# Patient Record
Sex: FEMALE | Race: WHITE | Employment: FULL TIME | URBAN - METROPOLITAN AREA
[De-identification: names, ages, dates, MRNs, and addresses within clinical notes are randomized per-mention and may not be internally consistent; named-entity substitution may affect disease eponyms.]

---

## 2017-06-01 ENCOUNTER — APPOINTMENT (OUTPATIENT)
Dept: OTHER | Facility: HOSPITAL | Age: 22
End: 2017-06-01
Attending: EMERGENCY MEDICINE

## 2019-08-03 ENCOUNTER — OFFICE VISIT (OUTPATIENT)
Dept: FAMILY MEDICINE CLINIC | Facility: CLINIC | Age: 24
End: 2019-08-03
Payer: COMMERCIAL

## 2019-08-03 ENCOUNTER — LAB ENCOUNTER (OUTPATIENT)
Dept: LAB | Age: 24
End: 2019-08-03
Attending: FAMILY MEDICINE
Payer: COMMERCIAL

## 2019-08-03 VITALS
BODY MASS INDEX: 25.21 KG/M2 | SYSTOLIC BLOOD PRESSURE: 117 MMHG | HEIGHT: 69 IN | DIASTOLIC BLOOD PRESSURE: 78 MMHG | HEART RATE: 62 BPM | TEMPERATURE: 99 F | WEIGHT: 170.19 LBS

## 2019-08-03 DIAGNOSIS — G44.209 TENSION HEADACHE: ICD-10-CM

## 2019-08-03 DIAGNOSIS — G44.209 TENSION HEADACHE: Primary | ICD-10-CM

## 2019-08-03 LAB
ALBUMIN SERPL-MCNC: 3.9 G/DL (ref 3.4–5)
ALBUMIN/GLOB SERPL: 1 {RATIO} (ref 1–2)
ALP LIVER SERPL-CCNC: 90 U/L (ref 37–98)
ALT SERPL-CCNC: 14 U/L (ref 13–56)
ANION GAP SERPL CALC-SCNC: 8 MMOL/L (ref 0–18)
AST SERPL-CCNC: 9 U/L (ref 15–37)
BASOPHILS # BLD AUTO: 0.03 X10(3) UL (ref 0–0.2)
BASOPHILS NFR BLD AUTO: 0.5 %
BILIRUB SERPL-MCNC: 0.3 MG/DL (ref 0.1–2)
BUN BLD-MCNC: 14 MG/DL (ref 7–18)
BUN/CREAT SERPL: 18.4 (ref 10–20)
CALCIUM BLD-MCNC: 9 MG/DL (ref 8.5–10.1)
CHLORIDE SERPL-SCNC: 107 MMOL/L (ref 98–112)
CO2 SERPL-SCNC: 27 MMOL/L (ref 21–32)
CREAT BLD-MCNC: 0.76 MG/DL (ref 0.55–1.02)
DEPRECATED RDW RBC AUTO: 42.5 FL (ref 35.1–46.3)
EOSINOPHIL # BLD AUTO: 0.09 X10(3) UL (ref 0–0.7)
EOSINOPHIL NFR BLD AUTO: 1.6 %
ERYTHROCYTE [DISTWIDTH] IN BLOOD BY AUTOMATED COUNT: 13.8 % (ref 11–15)
GLOBULIN PLAS-MCNC: 4 G/DL (ref 2.8–4.4)
GLUCOSE BLD-MCNC: 88 MG/DL (ref 70–99)
HCT VFR BLD AUTO: 39.9 % (ref 35–48)
HGB BLD-MCNC: 12.4 G/DL (ref 12–16)
IMM GRANULOCYTES # BLD AUTO: 0.01 X10(3) UL (ref 0–1)
IMM GRANULOCYTES NFR BLD: 0.2 %
LYMPHOCYTES # BLD AUTO: 1.59 X10(3) UL (ref 1–4)
LYMPHOCYTES NFR BLD AUTO: 28.6 %
M PROTEIN MFR SERPL ELPH: 7.9 G/DL (ref 6.4–8.2)
MCH RBC QN AUTO: 26.3 PG (ref 26–34)
MCHC RBC AUTO-ENTMCNC: 31.1 G/DL (ref 31–37)
MCV RBC AUTO: 84.5 FL (ref 80–100)
MONOCYTES # BLD AUTO: 0.39 X10(3) UL (ref 0.1–1)
MONOCYTES NFR BLD AUTO: 7 %
NEUTROPHILS # BLD AUTO: 3.45 X10 (3) UL (ref 1.5–7.7)
NEUTROPHILS # BLD AUTO: 3.45 X10(3) UL (ref 1.5–7.7)
NEUTROPHILS NFR BLD AUTO: 62.1 %
OSMOLALITY SERPL CALC.SUM OF ELEC: 294 MOSM/KG (ref 275–295)
PATIENT FASTING: YES
PLATELET # BLD AUTO: 257 10(3)UL (ref 150–450)
POTASSIUM SERPL-SCNC: 4.1 MMOL/L (ref 3.5–5.1)
RBC # BLD AUTO: 4.72 X10(6)UL (ref 3.8–5.3)
SODIUM SERPL-SCNC: 142 MMOL/L (ref 136–145)
TSI SER-ACNC: 2.33 MIU/ML (ref 0.36–3.74)
WBC # BLD AUTO: 5.6 X10(3) UL (ref 4–11)

## 2019-08-03 PROCEDURE — 36415 COLL VENOUS BLD VENIPUNCTURE: CPT

## 2019-08-03 PROCEDURE — 80053 COMPREHEN METABOLIC PANEL: CPT

## 2019-08-03 PROCEDURE — 99203 OFFICE O/P NEW LOW 30 MIN: CPT | Performed by: FAMILY MEDICINE

## 2019-08-03 PROCEDURE — 85025 COMPLETE CBC W/AUTO DIFF WBC: CPT

## 2019-08-03 PROCEDURE — 84443 ASSAY THYROID STIM HORMONE: CPT

## 2019-08-03 RX ORDER — KETOROLAC TROMETHAMINE 10 MG/1
10 TABLET, FILM COATED ORAL EVERY 6 HOURS PRN
Qty: 10 TABLET | Refills: 2 | Status: SHIPPED | OUTPATIENT
Start: 2019-08-03

## 2019-08-03 NOTE — PROGRESS NOTES
8/3/2019  8:48 AM    Cathie Babcock is a 25year old female. Chief complaint(s): Patient presents with:  Headache: x 2 weeks on and off, drowsy, light sensitivity, noise sensitive,     HPI:     Cathie Babcock primary complaint is regarding GRANDA.       John Wong Physical Exam    Constitutional: She appears well-developed and well-nourished.    /78 (BP Location: Right arm, Patient Position: Sitting, Cuff Size: adult)   Pulse 62   Temp 98.6 °F (37 °C) (Oral)   Ht 5' 9\" (1.753 m)   Wt 170 lb 3.2 oz (77.2 kg) Metabolic Panel (14) [E]      TSH W Reflex To Free T4 [E]      Meds This Visit:  Requested Prescriptions     Signed Prescriptions Disp Refills   • Ketorolac Tromethamine 10 MG Oral Tab 10 tablet 2     Sig: Take 1 tablet (10 mg total) by mouth every 6 (six)

## 2019-11-07 ENCOUNTER — OFFICE VISIT (OUTPATIENT)
Dept: FAMILY MEDICINE CLINIC | Facility: CLINIC | Age: 24
End: 2019-11-07
Payer: COMMERCIAL

## 2019-11-07 VITALS
HEIGHT: 69 IN | DIASTOLIC BLOOD PRESSURE: 76 MMHG | TEMPERATURE: 98 F | BODY MASS INDEX: 25.48 KG/M2 | HEART RATE: 73 BPM | SYSTOLIC BLOOD PRESSURE: 114 MMHG | WEIGHT: 172 LBS

## 2019-11-07 DIAGNOSIS — N76.0 ACUTE VAGINITIS: ICD-10-CM

## 2019-11-07 DIAGNOSIS — Z00.00 PHYSICAL EXAM: Primary | ICD-10-CM

## 2019-11-07 DIAGNOSIS — F12.90 MARIJUANA USE: ICD-10-CM

## 2019-11-07 DIAGNOSIS — R30.0 DYSURIA: ICD-10-CM

## 2019-11-07 PROCEDURE — 99213 OFFICE O/P EST LOW 20 MIN: CPT | Performed by: FAMILY MEDICINE

## 2019-11-07 PROCEDURE — 81003 URINALYSIS AUTO W/O SCOPE: CPT | Performed by: FAMILY MEDICINE

## 2019-11-07 PROCEDURE — 99395 PREV VISIT EST AGE 18-39: CPT | Performed by: FAMILY MEDICINE

## 2019-11-07 NOTE — PROGRESS NOTES
11/7/2019  2:19 PM    Patricia Parsons is a 25year old female. Chief complaint(s): Patient presents with:  Routine Physical  UTI    HPI:     Patricia Parsons primary complaint is regarding as above.      Patricia Parsons is a 25year old female present tod There is no immunization history on file for this patient. Medications (Active prior to today's visit):  Current Outpatient Medications   Medication Sig Dispense Refill   • Terconazole 0.4 % Vaginal Cream Place 1 applicator vaginally nightly.  45 g 0 reflex bilaterally. Normal nasal septum, throat clear without lesions or exudate and normal tonsils. Neck: Neck supple. No JVD present. No thyromegaly present.    Cardiovascular: Normal rate, regular rhythm and S2 normal.  Exam reveals no gallop and no fr Negative    APPEARANCE CLEAR Clear    Color Urine YELLOW Yellow    Multistix Lot# [id number] Numeric    Multistix Expiration Date 05/31/2020 Date       EKG / Spirometry : -     Radiology: No results found.      ASSESSMENT/PLAN:   Assessment   Physical exam  (pr Contraception option chosen by patient was condums. REFUSALS:  Although recommended, the patient refuses the following: none . FOLLOW-UP: Schedule a follow-up visit in 12 months. 2. Dysuria  3.  Acute vaginitis    Urinary Tract infection      M Imaging & Referrals:  EKG 12-LEAD         Sumi Brambila MD

## 2019-11-09 ENCOUNTER — LAB ENCOUNTER (OUTPATIENT)
Dept: LAB | Age: 24
End: 2019-11-09
Attending: FAMILY MEDICINE
Payer: COMMERCIAL

## 2019-11-09 DIAGNOSIS — Z00.00 PHYSICAL EXAM: ICD-10-CM

## 2019-11-09 PROCEDURE — 84443 ASSAY THYROID STIM HORMONE: CPT

## 2019-11-09 PROCEDURE — 85025 COMPLETE CBC W/AUTO DIFF WBC: CPT

## 2019-11-09 PROCEDURE — 83036 HEMOGLOBIN GLYCOSYLATED A1C: CPT

## 2019-11-09 PROCEDURE — 80061 LIPID PANEL: CPT

## 2019-11-09 PROCEDURE — 80053 COMPREHEN METABOLIC PANEL: CPT

## 2019-11-09 PROCEDURE — 86304 IMMUNOASSAY TUMOR CA 125: CPT

## 2019-11-09 PROCEDURE — 36415 COLL VENOUS BLD VENIPUNCTURE: CPT

## 2021-06-05 ENCOUNTER — IMMUNIZATION (OUTPATIENT)
Dept: LAB | Facility: HOSPITAL | Age: 26
End: 2021-06-05
Attending: EMERGENCY MEDICINE
Payer: COMMERCIAL

## 2021-06-05 DIAGNOSIS — Z23 NEED FOR VACCINATION: Primary | ICD-10-CM

## 2021-06-05 PROCEDURE — 0001A SARSCOV2 VAC 30MCG/0.3ML IM: CPT

## 2021-06-05 PROCEDURE — 0011A SARSCOV2 VAC 100MCG/0.5ML IM: CPT

## 2021-06-26 ENCOUNTER — IMMUNIZATION (OUTPATIENT)
Dept: LAB | Facility: HOSPITAL | Age: 26
End: 2021-06-26
Attending: EMERGENCY MEDICINE
Payer: COMMERCIAL

## 2021-06-26 DIAGNOSIS — Z23 NEED FOR VACCINATION: Primary | ICD-10-CM

## 2021-06-26 PROCEDURE — 0002A SARSCOV2 VAC 30MCG/0.3ML IM: CPT

## 2022-09-24 ENCOUNTER — OFFICE VISIT (OUTPATIENT)
Dept: FAMILY MEDICINE CLINIC | Facility: CLINIC | Age: 27
End: 2022-09-24

## 2022-09-24 VITALS
HEIGHT: 69 IN | DIASTOLIC BLOOD PRESSURE: 67 MMHG | BODY MASS INDEX: 27.55 KG/M2 | WEIGHT: 186 LBS | SYSTOLIC BLOOD PRESSURE: 103 MMHG | HEART RATE: 67 BPM

## 2022-09-24 DIAGNOSIS — N76.1 SUBACUTE VAGINITIS: ICD-10-CM

## 2022-09-24 DIAGNOSIS — Z00.00 PHYSICAL EXAM: Primary | ICD-10-CM

## 2022-09-24 PROCEDURE — 3074F SYST BP LT 130 MM HG: CPT | Performed by: FAMILY MEDICINE

## 2022-09-24 PROCEDURE — 3008F BODY MASS INDEX DOCD: CPT | Performed by: FAMILY MEDICINE

## 2022-09-24 PROCEDURE — 90471 IMMUNIZATION ADMIN: CPT | Performed by: FAMILY MEDICINE

## 2022-09-24 PROCEDURE — 99395 PREV VISIT EST AGE 18-39: CPT | Performed by: FAMILY MEDICINE

## 2022-09-24 PROCEDURE — 90715 TDAP VACCINE 7 YRS/> IM: CPT | Performed by: FAMILY MEDICINE

## 2022-09-24 PROCEDURE — 3078F DIAST BP <80 MM HG: CPT | Performed by: FAMILY MEDICINE

## 2022-09-26 ENCOUNTER — TELEPHONE (OUTPATIENT)
Dept: FAMILY MEDICINE CLINIC | Facility: CLINIC | Age: 27
End: 2022-09-26

## 2022-09-26 NOTE — TELEPHONE ENCOUNTER
Delfina Montes from [facility] Radiology Dept  Is calling to advise PCP the diagnosis on the mammogram is noted as a physical exam .  Please advise reason for mammogram as patient is 32years old and present diagnosis of physical exam will not pass through insurance.       Please call back at extension   [phone]

## 2022-09-29 LAB
CHLAMYDIA TRACHOMATIS$RNA, TMA: DETECTED
NEISSERIA GONORRHOEAE$RNA, TMA: NOT DETECTED

## 2022-09-30 LAB
ABSOLUTE BASOPHILS: 19 CELLS/UL (ref 0–200)
ABSOLUTE EOSINOPHILS: 202 CELLS/UL (ref 15–500)
ABSOLUTE LYMPHOCYTES: 1978 CELLS/UL (ref 850–3900)
ABSOLUTE MONOCYTES: 554 CELLS/UL (ref 200–950)
ABSOLUTE NEUTROPHILS: 3547 CELLS/UL (ref 1500–7800)
ALBUMIN/GLOBULIN RATIO: 1.3 (CALC) (ref 1–2.5)
ALBUMIN: 4.4 G/DL (ref 3.6–5.1)
ALKALINE PHOSPHATASE: 91 U/L (ref 31–125)
ALT: 9 U/L (ref 6–29)
AST: 12 U/L (ref 10–30)
BASOPHILS: 0.3 %
BILIRUBIN, TOTAL: 0.5 MG/DL (ref 0.2–1.2)
BILIRUBIN: NEGATIVE
BUN: 10 MG/DL (ref 7–25)
CA 125: 14 U/ML
CALCIUM: 9.5 MG/DL (ref 8.6–10.2)
CARBON DIOXIDE: 28 MMOL/L (ref 20–32)
CHLORIDE: 104 MMOL/L (ref 98–110)
CHOL/HDLC RATIO: 2.3 (CALC)
CHOLESTEROL, TOTAL: 125 MG/DL
COLOR: YELLOW
CREATININE: 0.74 MG/DL (ref 0.5–0.96)
EGFR: 114 ML/MIN/1.73M2
EOSINOPHILS: 3.2 %
GLOBULIN: 3.3 G/DL (CALC) (ref 1.9–3.7)
GLUCOSE: 86 MG/DL (ref 65–99)
GLUCOSE: NEGATIVE
HDL CHOLESTEROL: 54 MG/DL
HEMATOCRIT: 41.1 % (ref 35–45)
HEMOGLOBIN A1C: 5.2 % OF TOTAL HGB
HEMOGLOBIN: 12.7 G/DL (ref 11.7–15.5)
KETONES: NEGATIVE
LDL-CHOLESTEROL: 58 MG/DL (CALC)
LYMPHOCYTES: 31.4 %
MCH: 24.9 PG (ref 27–33)
MCHC: 30.9 G/DL (ref 32–36)
MCV: 80.6 FL (ref 80–100)
MONOCYTES: 8.8 %
MPV: 11.6 FL (ref 7.5–12.5)
NEUTROPHILS: 56.3 %
NITRITE: NEGATIVE
NON-HDL CHOLESTEROL: 71 MG/DL (CALC)
OCCULT BLOOD: NEGATIVE
PH: 8.5 (ref 5–8)
PLATELET COUNT: 282 THOUSAND/UL (ref 140–400)
POTASSIUM: 4.3 MMOL/L (ref 3.5–5.3)
PROTEIN, TOTAL: 7.7 G/DL (ref 6.1–8.1)
PROTEIN: NEGATIVE
RDW: 12.8 % (ref 11–15)
RED BLOOD CELL COUNT: 5.1 MILLION/UL (ref 3.8–5.1)
SODIUM: 138 MMOL/L (ref 135–146)
SPECIFIC GRAVITY: 1.02 (ref 1–1.03)
TRIGLYCERIDES: 53 MG/DL
TSH W/REFLEX TO FT4: 2.7 MIU/L
VITAMIN D, 25-OH, TOTAL: 23 NG/ML (ref 30–100)
WHITE BLOOD CELL COUNT: 6.3 THOUSAND/UL (ref 3.8–10.8)

## 2022-09-30 RX ORDER — ERGOCALCIFEROL 1.25 MG/1
50000 CAPSULE ORAL WEEKLY
Qty: 12 CAPSULE | Refills: 4 | Status: SHIPPED | OUTPATIENT
Start: 2022-09-30 | End: 2022-10-30

## 2022-10-03 ENCOUNTER — TELEMEDICINE (OUTPATIENT)
Dept: FAMILY MEDICINE CLINIC | Facility: CLINIC | Age: 27
End: 2022-10-03

## 2022-10-03 DIAGNOSIS — E55.9 VITAMIN D DEFICIENCY: ICD-10-CM

## 2022-10-03 DIAGNOSIS — R82.71 BACTERIA IN URINE: ICD-10-CM

## 2022-10-03 DIAGNOSIS — B37.31 VAGINAL CANDIDIASIS: Primary | ICD-10-CM

## 2022-10-03 RX ORDER — FLUCONAZOLE 200 MG/1
TABLET ORAL
Qty: 2 TABLET | Refills: 0 | Status: SHIPPED | OUTPATIENT
Start: 2022-10-03

## 2022-10-03 RX ORDER — NITROFURANTOIN 25; 75 MG/1; MG/1
100 CAPSULE ORAL 2 TIMES DAILY
Qty: 14 CAPSULE | Refills: 0 | Status: SHIPPED | OUTPATIENT
Start: 2022-10-03

## 2022-10-03 NOTE — ASSESSMENT & PLAN NOTE
macrobid 100 mg I po twice a day x 7 days  Supportive care discussed to help alleviate symptoms  . recheck urine in 2 weeks.

## 2022-11-25 ENCOUNTER — TELEPHONE (OUTPATIENT)
Dept: FAMILY MEDICINE CLINIC | Facility: CLINIC | Age: 27
End: 2022-11-25

## 2022-11-25 DIAGNOSIS — A74.9 CHLAMYDIA INFECTION: Primary | ICD-10-CM

## 2022-11-25 RX ORDER — AZITHROMYCIN 500 MG/1
TABLET, FILM COATED ORAL
Qty: 4 TABLET | Refills: 0 | Status: SHIPPED | OUTPATIENT
Start: 2022-11-25

## 2022-11-25 NOTE — TELEPHONE ENCOUNTER
Spoke to pharmacist Raleigh Mccartney at [facility] and advised of note below. Pharmacist still has concerns with dosing because it is normally a single dose 1 gram. Pharmacist would like further explanation.

## 2022-11-25 NOTE — TELEPHONE ENCOUNTER
Raleigh Mccartney from the pharmacy [facility] called back and he stated to disregard this message from earlier. He will dispense the medication.

## 2022-11-25 NOTE — TELEPHONE ENCOUNTER
Does not appear that pt was treated for chlamydia by Dr Patricia Mack. I have placed order for pt to take azithromycin 1 gm at time of  and repeat in one hour. She will need to contact any sexual partners and she will need to be rechecked in 3 months.

## 2022-11-25 NOTE — TELEPHONE ENCOUNTER
Received fax from [facility] asking for testing and treatment info- patient tested positive for chlamydia on 9/24/22. Dr. Patricia Mack did address results on 9/29/22 and asked that patient come in for appt regarding abnormal results. Patient was seen by Shannan Whitley on 10/3/22 (virtual visit), but I do not see that this was addressed or treated during that visit. Please advise.

## 2023-03-09 ENCOUNTER — OFFICE VISIT (OUTPATIENT)
Dept: FAMILY MEDICINE CLINIC | Facility: CLINIC | Age: 28
End: 2023-03-09

## 2023-03-09 VITALS
DIASTOLIC BLOOD PRESSURE: 72 MMHG | WEIGHT: 194.38 LBS | BODY MASS INDEX: 28.79 KG/M2 | SYSTOLIC BLOOD PRESSURE: 118 MMHG | HEART RATE: 64 BPM | HEIGHT: 69 IN

## 2023-03-09 DIAGNOSIS — A74.9 CHLAMYDIA INFECTION: Primary | ICD-10-CM

## 2023-03-09 DIAGNOSIS — Z11.3 SCREEN FOR STD (SEXUALLY TRANSMITTED DISEASE): ICD-10-CM

## 2023-03-09 DIAGNOSIS — N76.1 SUBACUTE VAGINITIS: ICD-10-CM

## 2023-03-09 PROCEDURE — 3078F DIAST BP <80 MM HG: CPT | Performed by: FAMILY MEDICINE

## 2023-03-09 PROCEDURE — 3008F BODY MASS INDEX DOCD: CPT | Performed by: FAMILY MEDICINE

## 2023-03-09 PROCEDURE — 3074F SYST BP LT 130 MM HG: CPT | Performed by: FAMILY MEDICINE

## 2023-03-09 PROCEDURE — 99213 OFFICE O/P EST LOW 20 MIN: CPT | Performed by: FAMILY MEDICINE

## 2023-03-10 ENCOUNTER — TELEPHONE (OUTPATIENT)
Dept: FAMILY MEDICINE CLINIC | Facility: CLINIC | Age: 28
End: 2023-03-10

## 2023-03-10 LAB
CHLAMYDIA TRACHOMATIS$RNA, TMA: NOT DETECTED
NEISSERIA GONORRHOEAE$RNA, TMA: NOT DETECTED

## 2023-03-10 NOTE — TELEPHONE ENCOUNTER
Spoke with Meena from [facility] lab,  verified  Claudia Polanco stated to clarify lab order for genital vaginosis and to call her back tel #[phone]   She stated they rec'd the sample but we  didn't collect the right sample. Is MD looking for bacterial or vaginal culture? Reference # [id number]  pls advise, thanks in advance.

## 2023-03-16 LAB
CHLAMYDIA TRACHOMATIS$RNA, TMA: NOT DETECTED
NEISSERIA GONORRHOEAE$RNA, TMA: NOT DETECTED

## 2023-03-17 RX ORDER — AMOXICILLIN AND CLAVULANATE POTASSIUM 875; 125 MG/1; MG/1
1 TABLET, FILM COATED ORAL 2 TIMES DAILY
Qty: 14 TABLET | Refills: 0 | Status: SHIPPED | OUTPATIENT
Start: 2023-03-17 | End: 2023-03-24

## 2023-03-20 ENCOUNTER — MED REC SCAN ONLY (OUTPATIENT)
Dept: FAMILY MEDICINE CLINIC | Facility: CLINIC | Age: 28
End: 2023-03-20

## 2024-09-11 ENCOUNTER — OFFICE VISIT (OUTPATIENT)
Dept: FAMILY MEDICINE CLINIC | Facility: CLINIC | Age: 29
End: 2024-09-11
Payer: COMMERCIAL

## 2024-09-11 VITALS
BODY MASS INDEX: 28.58 KG/M2 | DIASTOLIC BLOOD PRESSURE: 80 MMHG | HEART RATE: 72 BPM | SYSTOLIC BLOOD PRESSURE: 123 MMHG | WEIGHT: 193 LBS | HEIGHT: 69 IN

## 2024-09-11 DIAGNOSIS — Z3A.01 LESS THAN 8 WEEKS GESTATION OF PREGNANCY (HCC): ICD-10-CM

## 2024-09-11 DIAGNOSIS — Z00.00 PHYSICAL EXAM: Primary | ICD-10-CM

## 2024-09-11 LAB
CONTROL LINE PRESENT WITH A CLEAR BACKGROUND (YES/NO): YES YES/NO
KIT LOT #: NORMAL NUMERIC
PREGNANCY TEST, URINE: POSITIVE

## 2024-09-11 PROCEDURE — 99395 PREV VISIT EST AGE 18-39: CPT | Performed by: FAMILY MEDICINE

## 2024-09-11 PROCEDURE — 81025 URINE PREGNANCY TEST: CPT | Performed by: FAMILY MEDICINE

## 2024-09-11 PROCEDURE — 99213 OFFICE O/P EST LOW 20 MIN: CPT | Performed by: FAMILY MEDICINE

## 2024-09-11 RX ORDER — PNV NO.95/FERROUS FUM/FOLIC AC 28MG-0.8MG
1 TABLET ORAL NIGHTLY
Qty: 90 TABLET | Refills: 2 | Status: SHIPPED | OUTPATIENT
Start: 2024-09-11 | End: 2024-10-11

## 2024-09-11 NOTE — PROGRESS NOTES
9/11/2024  9:47 AM    Catalina Hodges is a 29 year old female.    Chief complaint(s):   Chief Complaint   Patient presents with    Physical    Pregnancy     HPI:     Catalina Hodges primary complaint is regarding CPE.     Catalina Hodges is a 29 year old female present today for a routine periodic health gynecological screening and/or complete physical examination.  Her last physical exam was 2 year(s) ago. Patient's last menstrual period was 07/25/2024 (approximate).     Menarche occurred at age 13 .  She is currently using none as a form of contraception.    Catalina Hodges is G 1, P0, Ab 0.   She has a history of veneral infection significant for none.   She performs breast self-exams monthly .  Her last TDAP (orTD) booster was 3 years ago.  She is not current with her influenza immunization.  Her last Pap smear was none.  Her last mammogram was none.  Regarding colon cancer  screening test she underwent colonoscopy none . Occasionally smoke.         HISTORY:  History reviewed. No pertinent past medical history.   History reviewed. No pertinent surgical history.   Family History   Problem Relation Age of Onset    Cancer Mother 40        breast cancer      Social History:   Social History     Socioeconomic History    Marital status: Single   Tobacco Use    Smoking status: Never    Smokeless tobacco: Never   Vaping Use    Vaping status: Never Used   Substance and Sexual Activity    Alcohol use: Yes    Drug use: Never        Immunizations:   Immunization History   Administered Date(s) Administered    Covid-19 Vaccine Pfizer 30 mcg/0.3 ml 06/05/2021, 06/26/2021    HPV (Gardasil) 06/09/2014    TDAP 09/24/2022       Medications (Active prior to today's visit):  Current Outpatient Medications   Medication Sig Dispense Refill    Prenatal Vit-Fe Fumarate-FA (PRENATAL VITAMINS) 28-0.8 MG Oral Tab Take 1 tablet by mouth at bedtime. 90 tablet 2       Allergies:  No Known Allergies      ROS:   Review of Systems    Constitutional:  Negative for appetite change, diaphoresis, fatigue and fever.   HENT:  Negative for hearing loss and nosebleeds.    Eyes:  Negative for visual disturbance.   Respiratory:  Negative for shortness of breath.    Cardiovascular:  Negative for chest pain and palpitations.   Gastrointestinal:  Negative for abdominal pain, diarrhea, nausea and vomiting.   Endocrine: Negative for polydipsia and polyuria.   Genitourinary:  Positive for menstrual problem. Negative for hematuria.   Musculoskeletal:  Negative for arthralgias.   Skin:  Negative for rash.   Neurological:  Positive for headaches. Negative for dizziness.   Psychiatric/Behavioral:  Negative for dysphoric mood and sleep disturbance.      PHYSICAL EXAM:   VS: /80 (BP Location: Right arm, Patient Position: Sitting, Cuff Size: adult)   Pulse 72   Ht 5' 9\" (1.753 m)   Wt 193 lb (87.5 kg)   LMP 07/25/2024 (Exact Date)   BMI 28.50 kg/m²     Physical Exam  Vitals reviewed.   HENT:      Head: Normocephalic.      Right Ear: Hearing, tympanic membrane, ear canal and external ear normal.      Left Ear: Hearing, tympanic membrane, ear canal and external ear normal.      Nose: Nose normal.      Mouth/Throat:      Mouth: Mucous membranes are moist.      Pharynx: Oropharynx is clear.   Eyes:      Extraocular Movements: Extraocular movements intact.      Conjunctiva/sclera: Conjunctivae normal.      Pupils: Pupils are equal, round, and reactive to light.   Neck:      Thyroid: No thyromegaly.   Cardiovascular:      Rate and Rhythm: Normal rate and regular rhythm.      Heart sounds: Normal heart sounds, S1 normal and S2 normal. No murmur heard.  Pulmonary:      Effort: Pulmonary effort is normal.      Breath sounds: Normal breath sounds.   Chest:   Breasts:     Right: Normal. No mass, nipple discharge or tenderness.      Left: Normal. No mass, nipple discharge or tenderness.   Abdominal:      General: Bowel sounds are normal.      Palpations: Abdomen is  soft. There is no mass.      Tenderness: There is no abdominal tenderness.   Genitourinary:     Cervix: Cervical bleeding: small.      Comments: GYN pelvic exam: Normal external genitalia, without lesions or condyloma.  There is no vaginal discharge , cervix with no motion tenderness.  Adnexal with no masses or tenderness uterine normal appropriate size without tenderness.  Musculoskeletal:      Cervical back: Neck supple.      Right lower leg: No edema.      Left lower leg: No edema.      Comments: Spine without scoliosis or kyphosis.  Range of motions of both upper and lower extremities are normal.       Skin:     Findings: No rash.   Neurological:      General: No focal deficit present.      Mental Status: She is alert and oriented to person, place, and time.      Deep Tendon Reflexes:      Reflex Scores:       Patellar reflexes are 2+ on the right side and 2+ on the left side.     Comments: No focal neurological dificits.  Normal gait and coordination.   Psychiatric:         Mood and Affect: Mood and affect normal.         LABORATORY RESULTS:     EKG / Spirometry : -     Radiology: No results found.     ASSESSMENT/PLAN:   Assessment   Encounter Diagnoses   Name Primary?    Physical exam Yes    Less than 8 weeks gestation of pregnancy (HCC)        1. Physical exam    Assessment and Plan:     Catalina Hodges Health checkup as follows:    LABORATORY & ORDERS:   Orders Placed This Encounter   Procedures    POC Urine pregnancy test [95119]    CBC With Differential With Platelet    Comp Metabolic Panel (14)    Hemoglobin A1C    Lipid Panel    TSH W Reflex To Free T4    Vitamin D    Urinalysis with Culture Reflex    THIN PREP PAP W/ HPV REFLEX [[id number]][Q]    Rubella, IGG    T Pallidum Screening Cascade    Chlamydia/Gc Amplification    Urine Culture, Routine      REFERRALS: generated today : OBG - INTERNAL  US PREG 1ST TRIM W/EV (CPT=76801/95921) .    IMMUNIZATIONS ordered and given today include:  none.    RECOMMENDATIONS given include: ANTICIPATORY GUIDANCE  topics covered today include: safety (i.e. seat belts, helmets, sunscreen, protective sports gear ), nutrition (i.e. healthy meals and snacks (i.e. avoid junk food and high-carbohydrate foods); athletic conditioning, fluids; low fat milk, limit to less than 20 oz. a day; dental care with her dentist), and healthy habits & social competence & responsibilities: Recommendations on physical activity; exercise daily or at least 3 times a week for 30-60 minutes doing cardiovascular exercise. Patient educated on self breast examination to be done on a monthly basis.  REFUSALS:  Although recommended, the patient refuses the following: none .      FOLLOW-UP: Schedule a follow-up visit in 12 months.         2. Less than 8 weeks gestation of pregnancy (HCC)    MEDICATIONS:     Requested Prescriptions     Signed Prescriptions Disp Refills    Prenatal Vit-Fe Fumarate-FA (PRENATAL VITAMINS) 28-0.8 MG Oral Tab 90 tablet 2     Sig: Take 1 tablet by mouth at bedtime.           LABORATORY & ORDERS:   Orders Placed This Encounter   Procedures    POC Urine pregnancy test [33634]    CBC With Differential With Platelet    Comp Metabolic Panel (14)    Hemoglobin A1C    Lipid Panel    TSH W Reflex To Free T4    Vitamin D    Urinalysis with Culture Reflex    THIN PREP PAP W/ HPV REFLEX [38415][Q]    Rubella, IGG    T Pallidum Screening Cascade    Chlamydia/Gc Amplification    Urine Culture, Routine       REFERRALS: OBG - INTERNAL  US PREG 1ST TRIM W/EV (CPT=76801/27916),    THIN PREP PAP W/ HPV REFLEX [76292][Q]            OBG Referral - In Network     PREG 1ST TRIM W/EV (CPT=76801/07165)        RECOMMENDATIONS given include: Patient was reassured of  her medical condition and all questions and concerns were answered. Patient was informed to please, call our office with any new or further questions or concerns that may come up in the near future. Notify Dr Noonan or the  [facility] if there is a deterioration or worsening of the medical condition. Also, inform the doctor with any new symptoms or medications' side effects.      FOLLOW-UP: Schedule a follow-up visit in  prn.               Orders This Visit:  Orders Placed This Encounter   Procedures    POC Urine pregnancy test [31032]    CBC With Differential With Platelet    Comp Metabolic Panel (14)    Hemoglobin A1C    Lipid Panel    TSH W Reflex To Free T4    Vitamin D    Urinalysis with Culture Reflex    THIN PREP PAP W/ HPV REFLEX [38220][Q]    Rubella, IGG    T Pallidum Screening Cascade    Chlamydia/Gc Amplification    Urine Culture, Routine       Meds This Visit:  Requested Prescriptions     Signed Prescriptions Disp Refills    Prenatal Vit-Fe Fumarate-FA (PRENATAL VITAMINS) 28-0.8 MG Oral Tab 90 tablet 2     Sig: Take 1 tablet by mouth at bedtime.       Imaging & Referrals:  OBG - INTERNAL  US PREG 1ST TRIM W/EV (CPT=76801/23485)         SARY RAWLS MD

## 2024-09-12 LAB
ABSOLUTE BASOPHILS: 23 CELLS/UL (ref 0–200)
ABSOLUTE EOSINOPHILS: 68 CELLS/UL (ref 15–500)
ABSOLUTE LYMPHOCYTES: 1989 CELLS/UL (ref 850–3900)
ABSOLUTE MONOCYTES: 428 CELLS/UL (ref 200–950)
ABSOLUTE NEUTROPHILS: 3192 CELLS/UL (ref 1500–7800)
ALBUMIN/GLOBULIN RATIO: 1.2 (CALC) (ref 1–2.5)
ALBUMIN: 4.3 G/DL (ref 3.6–5.1)
ALKALINE PHOSPHATASE: 98 U/L (ref 31–125)
ALT: 9 U/L (ref 6–29)
APPEARANCE: CLEAR
AST: 14 U/L (ref 10–30)
BASOPHILS: 0.4 %
BILIRUBIN, TOTAL: 0.5 MG/DL (ref 0.2–1.2)
BILIRUBIN: NEGATIVE
BUN: 7 MG/DL (ref 7–25)
CALCIUM: 9.6 MG/DL (ref 8.6–10.2)
CARBON DIOXIDE: 25 MMOL/L (ref 20–32)
CHLORIDE: 104 MMOL/L (ref 98–110)
CHOL/HDLC RATIO: 2.7 (CALC)
CHOLESTEROL, TOTAL: 136 MG/DL
COLOR: YELLOW
CREATININE: 0.67 MG/DL (ref 0.5–0.96)
EGFR: 121 ML/MIN/1.73M2
EOSINOPHILS: 1.2 %
GLOBULIN: 3.6 G/DL (CALC) (ref 1.9–3.7)
GLUCOSE: 87 MG/DL (ref 65–99)
GLUCOSE: NEGATIVE
HDL CHOLESTEROL: 50 MG/DL
HEMATOCRIT: 41 % (ref 35–45)
HEMOGLOBIN A1C: 5.6 % OF TOTAL HGB
HEMOGLOBIN: 12.7 G/DL (ref 11.7–15.5)
LDL-CHOLESTEROL: 74 MG/DL (CALC)
LEUKOCYTE ESTERASE: NEGATIVE
LYMPHOCYTES: 34.9 %
MCH: 25.5 PG (ref 27–33)
MCHC: 31 G/DL (ref 32–36)
MCV: 82.2 FL (ref 80–100)
MONOCYTES: 7.5 %
MPV: 11.9 FL (ref 7.5–12.5)
NEUTROPHILS: 56 %
NITRITE: NEGATIVE
NON-HDL CHOLESTEROL: 86 MG/DL (CALC)
OCCULT BLOOD: NEGATIVE
PH: 7.5 (ref 5–8)
PLATELET COUNT: 299 THOUSAND/UL (ref 140–400)
POTASSIUM: 4.3 MMOL/L (ref 3.5–5.3)
PROTEIN, TOTAL: 7.9 G/DL (ref 6.1–8.1)
PROTEIN: NEGATIVE
RDW: 12.9 % (ref 11–15)
RED BLOOD CELL COUNT: 4.99 MILLION/UL (ref 3.8–5.1)
RUBELLA ANTIBODY (IGG): 2.26 INDEX
SODIUM: 138 MMOL/L (ref 135–146)
SPECIFIC GRAVITY: 1.01 (ref 1–1.03)
T. PALLIDUM AB, EIA: NEGATIVE
TRIGLYCERIDES: 42 MG/DL
TSH W/REFLEX TO FT4: 2.26 MIU/L
WHITE BLOOD CELL COUNT: 5.7 THOUSAND/UL (ref 3.8–10.8)

## 2024-09-16 LAB
CHLAMYDIA TRACHOMATIS$RNA, TMA: DETECTED
NEISSERIA GONORRHOEAE$RNA, TMA: NOT DETECTED

## 2024-09-16 RX ORDER — AZITHROMYCIN 250 MG/1
TABLET, FILM COATED ORAL
Qty: 4 TABLET | Refills: 0 | Status: SHIPPED | OUTPATIENT
Start: 2024-09-16

## 2024-09-17 ENCOUNTER — TELEPHONE (OUTPATIENT)
Dept: FAMILY MEDICINE CLINIC | Facility: CLINIC | Age: 29
End: 2024-09-17

## 2024-09-17 NOTE — TELEPHONE ENCOUNTER
----- Message from SARY RAWLS sent at 9/16/2024  8:15 AM CDT -----  Please call patient, + Chlamydia. Rx sen to her pharmacy. Recheck and make appt with me in 2-4 weeks. Also partner needs treatment.

## 2024-09-17 NOTE — TELEPHONE ENCOUNTER
Spoke with the patient informed her of message and instructions below    Has not yet  medication, will do so later tonight.    Assisted with appointment    Future Appointments   Date Time Provider Department Center   10/10/2024 11:15 AM [facility]   10/17/2024  1:00 PM ADO US [facility] ADO US [facility]     [facility] form started     RN please call in am to see if patient has  script and sign and send forms

## 2024-09-18 NOTE — TELEPHONE ENCOUNTER
Left message to call back for patient.    This RN spoke with Keshawn at [facility]- confirmed that prescription has been picked up by patient yet at this time.

## 2024-10-05 ENCOUNTER — MED REC SCAN ONLY (OUTPATIENT)
Dept: FAMILY MEDICINE CLINIC | Facility: CLINIC | Age: 29
End: 2024-10-05

## 2024-10-11 ENCOUNTER — OFFICE VISIT (OUTPATIENT)
Dept: FAMILY MEDICINE CLINIC | Facility: CLINIC | Age: 29
End: 2024-10-11
Payer: COMMERCIAL

## 2024-10-11 VITALS
BODY MASS INDEX: 28.58 KG/M2 | HEART RATE: 68 BPM | DIASTOLIC BLOOD PRESSURE: 67 MMHG | HEIGHT: 69 IN | SYSTOLIC BLOOD PRESSURE: 101 MMHG | WEIGHT: 193 LBS

## 2024-10-11 DIAGNOSIS — A74.9 CHLAMYDIA INFECTION: Primary | ICD-10-CM

## 2024-10-11 DIAGNOSIS — Z3A.01 LESS THAN 8 WEEKS GESTATION OF PREGNANCY (HCC): ICD-10-CM

## 2024-10-11 PROCEDURE — 99213 OFFICE O/P EST LOW 20 MIN: CPT | Performed by: FAMILY MEDICINE

## 2024-10-11 NOTE — PROGRESS NOTES
10/11/2024  12:29 PM    Catalina Hodges is a 29 year old female.    Chief complaint(s):   Chief Complaint   Patient presents with    Test Results     GC      HPI:     Catalina Hodges primary complaint is regarding chlamydia.     Patient is a 29-year-old female who recently tested positive for chlamydia vaginally.  She took antibiotic already but partner has not been treated.  She denies any sex since she finished the treatment.  She is only had vaginal sex with 1 person.  Will provide a EPT prescription today.  Also she is pregnant at present time.  Has not had the ultrasound that was ordered last time.  Patient's last menstrual period was 07/25/2024 (exact date).  She has been referred already to obstetrician Dr. Sewell.       HISTORY:  No past medical history on file.   No past surgical history on file.   Family History   Problem Relation Age of Onset    Cancer Mother 40        breast cancer      Social History:   Social History     Socioeconomic History    Marital status: Single   Tobacco Use    Smoking status: Never    Smokeless tobacco: Never   Vaping Use    Vaping status: Never Used   Substance and Sexual Activity    Alcohol use: Yes    Drug use: Never        Immunizations:   Immunization History   Administered Date(s) Administered    Covid-19 Vaccine Pfizer 30 mcg/0.3 ml 06/05/2021, 06/26/2021    HPV (Gardasil) 06/09/2014    TDAP 09/24/2022       Medications (Active prior to today's visit):  Current Outpatient Medications   Medication Sig Dispense Refill    Prenatal Vit-Fe Fumarate-FA (PRENATAL VITAMINS) 28-0.8 MG Oral Tab Take 1 tablet by mouth at bedtime. 90 tablet 2    azithromycin 250 MG Oral Tab 4 tab po today and done (Patient not taking: Reported on 10/11/2024) 4 tablet 0       Allergies:  Allergies[1]      ROS:   Review of Systems   Constitutional:  Negative for appetite change and fever.   Eyes:  Negative for visual disturbance.   Respiratory:  Negative for shortness of breath.    Cardiovascular:   Negative for chest pain.   Gastrointestinal:  Negative for abdominal pain, nausea and vomiting.   Genitourinary:  Positive for menstrual problem (pregnancy). Negative for dysuria, vaginal bleeding, vaginal discharge and vaginal pain.   Musculoskeletal:  Negative for back pain.   Skin:  Negative for rash.   Neurological:  Negative for dizziness and headaches.       PHYSICAL EXAM:   VS: /67   Pulse 68   Ht 5' 9\" (1.753 m)   Wt 193 lb (87.5 kg)   LMP 07/25/2024 (Exact Date)   BMI 28.50 kg/m²     Physical Exam  Vitals reviewed.   Constitutional:       General: She is not in acute distress.     Appearance: Normal appearance.   HENT:      Head: Normocephalic.   Eyes:      Conjunctiva/sclera: Conjunctivae normal.   Cardiovascular:      Rate and Rhythm: Normal rate.   Pulmonary:      Effort: Pulmonary effort is normal.   Genitourinary:     Vagina: No tenderness.      Cervix: Normal. No cervical motion tenderness.   Musculoskeletal:      Cervical back: Neck supple.   Skin:     Findings: No rash.   Psychiatric:         Mood and Affect: Mood normal.         LABORATORY RESULTS:   No results found for: \"URCOLOR\", \"URCLA\", \"URINELEUK\", \"URINENITRITE\", \"URINEBLOOD\"   Results for orders placed or performed in visit on 09/11/24   POC Urine pregnancy test [16572]    Collection Time: 09/11/24  9:49 AM   Result Value Ref Range    Pregnancy Test, Urine positive     Control Line Present with a clear background (yes/no) yes Yes/No    Kit Lot # [id number] Numeric    Kit Expiration Date 01- Date   THIN PREP PAP W/ HPV REFLEX [[id number]][Q]    Collection Time: 09/11/24  9:53 AM   Result Value Ref Range    CLINICAL INFORMATION:      LMP:      PREV. PAP:      PREV. BX:      SOURCE:      STATEMENT OF ADEQUACY:      INTERPRETATION/RESULT:      COMMENT:      CYTOTECHNOLOGIST:      COMMENT     Chlamydia/Gc Amplification    Collection Time: 09/11/24  9:53 AM   Result Value Ref Range    CHLAMYDIA TRACHOMATIS$RNA, TMA DETECTED (A) NOT  DETECTED    NEISSERIA GONORRHOEAE$RNA, TMA NOT DETECTED NOT DETECTED    .      CBC With Differential With Platelet    Collection Time: 09/11/24 12:05 PM   Result Value Ref Range    WHITE BLOOD CELL COUNT 5.7 3.8 - 10.8 Thousand/uL    RED BLOOD CELL COUNT 4.99 3.80 - 5.10 Million/uL    HEMOGLOBIN 12.7 11.7 - 15.5 g/dL    HEMATOCRIT 41.0 35.0 - 45.0 %    MCV 82.2 80.0 - 100.0 fL    MCH 25.5 (L) 27.0 - 33.0 pg    MCHC 31.0 (L) 32.0 - 36.0 g/dL    RDW 12.9 11.0 - 15.0 %    PLATELET COUNT 299 140 - 400 Thousand/uL    MPV 11.9 7.5 - 12.5 fL    ABSOLUTE NEUTROPHILS 3,192 1,500 - 7,800 cells/uL    ABSOLUTE LYMPHOCYTES 1,989 850 - 3,900 cells/uL    ABSOLUTE MONOCYTES 428 200 - 950 cells/uL    ABSOLUTE EOSINOPHILS 68 15 - 500 cells/uL    ABSOLUTE BASOPHILS 23 0 - 200 cells/uL    NEUTROPHILS 56 %    LYMPHOCYTES 34.9 %    MONOCYTES 7.5 %    EOSINOPHILS 1.2 %    BASOPHILS 0.4 %   Comp Metabolic Panel (14)    Collection Time: 09/11/24 12:05 PM   Result Value Ref Range    GLUCOSE 87 65 - 99 mg/dL    UREA NITROGEN (BUN) 7 7 - 25 mg/dL    CREATININE 0.67 0.50 - 0.96 mg/dL    EGFR 121 > OR = 60 mL/min/1.73m2    BUN/CREATININE RATIO SEE NOTE: 6 - 22 (calc)    SODIUM 138 135 - 146 mmol/L    POTASSIUM 4.3 3.5 - 5.3 mmol/L    CHLORIDE 104 98 - 110 mmol/L    CARBON DIOXIDE 25 20 - 32 mmol/L    CALCIUM 9.6 8.6 - 10.2 mg/dL    PROTEIN, TOTAL 7.9 6.1 - 8.1 g/dL    ALBUMIN 4.3 3.6 - 5.1 g/dL    GLOBULIN 3.6 1.9 - 3.7 g/dL (calc)    ALBUMIN/GLOBULIN RATIO 1.2 1.0 - 2.5 (calc)    BILIRUBIN, TOTAL 0.5 0.2 - 1.2 mg/dL    ALKALINE PHOSPHATASE 98 31 - 125 U/L    AST 14 10 - 30 U/L    ALT 9 6 - 29 U/L   Hemoglobin A1C    Collection Time: 09/11/24 12:05 PM   Result Value Ref Range    HEMOGLOBIN A1c 5.6 <5.7 % of total Hgb   Lipid Panel    Collection Time: 09/11/24 12:05 PM   Result Value Ref Range    CHOLESTEROL, TOTAL 136 <200 mg/dL    HDL CHOLESTEROL 50 > OR = 50 mg/dL    TRIGLYCERIDES 42 <150 mg/dL    LDL-CHOLESTEROL 74 mg/dL (calc)    CHOL/HDLC  RATIO 2.7 <5.0 (calc)    NON-HDL CHOLESTEROL 86 <130 mg/dL (calc)   TSH W Reflex To Free T4    Collection Time: 09/11/24 12:05 PM   Result Value Ref Range    TSH W/REFLEX TO FT4 2.26 mIU/L   Rubella, IGG    Collection Time: 09/11/24 12:05 PM   Result Value Ref Range    RUBELLA ANTIBODY (IGG) 2.26 Index   T Pallidum Screening Cascade    Collection Time: 09/11/24 12:05 PM   Result Value Ref Range    T. PALLIDUM AB, EIA NEGATIVE NEGATIVE   Urinalysis with Culture Reflex    Collection Time: 09/11/24 12:08 PM    Specimen: Urine   Result Value Ref Range    COLOR YELLOW YELLOW    APPEARANCE CLEAR CLEAR    SPECIFIC GRAVITY 1.011 1.001 - 1.035    PH 7.5 5.0 - 8.0    GLUCOSE NEGATIVE NEGATIVE    BILIRUBIN NEGATIVE NEGATIVE    KETONES TRACE (A) NEGATIVE    OCCULT BLOOD NEGATIVE NEGATIVE    PROTEIN NEGATIVE NEGATIVE    NITRITE NEGATIVE NEGATIVE    LEUKOCYTE ESTERASE NEGATIVE NEGATIVE    WBC Urine NONE SEEN < OR = 5 /HPF    RBC NONE SEEN < OR = 2 /HPF    SQUAMOUS EPITHELIAL CELLS NONE SEEN < OR = 5 /HPF    BACTERIA NONE SEEN NONE SEEN /HPF    HYALINE CAST NONE SEEN NONE SEEN /LPF    NOTE      REFLEXIVE URINE CULTURE         EKG / Spirometry : -     Radiology: No results found.     ASSESSMENT/PLAN:   Assessment   Encounter Diagnoses   Name Primary?    Chlamydia infection Yes    Less than 8 weeks gestation of pregnancy (HCC)        MEDICATIONS:   RX given for EPT of zithro 4 tab po         LABORATORY & ORDERS:   Orders Placed This Encounter   Procedures    Chlamydia/Gc Amplification       REFERRALS: KPA with OB doctor     RECOMMENDATIONS given include: Patient was reassured of  her medical condition and all questions and concerns were answered. Patient was informed to please, call our office with any new or further questions or concerns that may come up in the near future. Notify Dr Noonan or the [facility] if there is a deterioration or worsening of the medical condition. Also, inform the doctor with any new symptoms or  medications' side effects.      FOLLOW-UP: Schedule a follow-up visit in  prn.            Orders This Visit:  Orders Placed This Encounter   Procedures    Chlamydia/Gc Amplification       Meds This Visit:  Requested Prescriptions      No prescriptions requested or ordered in this encounter       Imaging & Referrals:  None         SARY RAWLS MD         [1] No Known Allergies

## 2024-10-14 LAB
C TRACH DNA SPEC QL NAA+PROBE: NEGATIVE
N GONORRHOEA DNA SPEC QL NAA+PROBE: NEGATIVE

## 2024-10-17 ENCOUNTER — HOSPITAL ENCOUNTER (OUTPATIENT)
Dept: ULTRASOUND IMAGING | Age: 29
Discharge: HOME | End: 2024-10-17
Attending: FAMILY MEDICINE
Payer: COMMERCIAL

## 2024-10-17 DIAGNOSIS — Z3A.01 LESS THAN 8 WEEKS GESTATION OF PREGNANCY (HCC): ICD-10-CM

## 2024-10-17 PROCEDURE — 76801 OB US < 14 WKS SINGLE FETUS: CPT | Performed by: FAMILY MEDICINE

## 2024-12-06 ENCOUNTER — TELEPHONE (OUTPATIENT)
Dept: OBGYN CLINIC | Facility: CLINIC | Age: 29
End: 2024-12-06

## 2024-12-06 NOTE — TELEPHONE ENCOUNTER
Pt name and  verified. Pt calling to establish prenatal care    Lmp:  19w1d  + hpt: second week of August  Regular cycles  Denies hx of miscarriage  Prenatal care prior: denies    Pt scheduled for missed menses on  with Dr. Sewell. Pt aware of scheduling details.

## 2024-12-06 NOTE — TELEPHONE ENCOUNTER
Patient would like to establish prenatal care. LMP in June or July. Has not had prenatal care yet. Please call.

## 2024-12-17 ENCOUNTER — INITIAL PRENATAL (OUTPATIENT)
Dept: OBGYN CLINIC | Facility: CLINIC | Age: 29
End: 2024-12-17
Payer: COMMERCIAL

## 2024-12-17 VITALS
SYSTOLIC BLOOD PRESSURE: 117 MMHG | WEIGHT: 198 LBS | DIASTOLIC BLOOD PRESSURE: 72 MMHG | HEART RATE: 77 BPM | BODY MASS INDEX: 29 KG/M2

## 2024-12-17 DIAGNOSIS — O09.899: ICD-10-CM

## 2024-12-17 DIAGNOSIS — Z34.01 ENCOUNTER FOR SUPERVISION OF NORMAL FIRST PREGNANCY IN FIRST TRIMESTER (HCC): Primary | ICD-10-CM

## 2024-12-17 PROCEDURE — 90656 IIV3 VACC NO PRSV 0.5 ML IM: CPT | Performed by: STUDENT IN AN ORGANIZED HEALTH CARE EDUCATION/TRAINING PROGRAM

## 2024-12-17 PROCEDURE — 90471 IMMUNIZATION ADMIN: CPT | Performed by: STUDENT IN AN ORGANIZED HEALTH CARE EDUCATION/TRAINING PROGRAM

## 2024-12-17 NOTE — PROGRESS NOTES
[facility]  Obstetrics and Gynecology  History & Physical    CC: Establish prenatal care     Subjective:     HPI:  Catalina Hodges is a 29 year old  female at 20w5d who presents today to establish prenatal care.   Partner is not here but involved.     Patient's last menstrual period was 2024 (exact date).   LMP - certain  Menses - regular    Pregnancy was unplanned but desired.   Was using not using contraception at time of conception  Plan to keep/continue pregnancy? y   Folic acid prior to conceiving? y  Currently taking PNV containing iron? n    Symptoms this pregnancy:   Vaginal bleeding during pregnancy? n  Pelvic cramping/pain? n   Abnormal vaginal discharge? n     Partner/Father of the baby   -Medical conditions? n  -FHx of genetic diseases or birth defects? n     Review of Systems: negative except per HPI     PCP SARY RAWLS MD     OB:  OB History    Para Term  AB Living   1             SAB IAB Ectopic Multiple Live Births                  # Outcome Date GA Lbr Pravin/2nd Weight Sex Type Anes PTL Lv   1 Current                GYN:   STD -chlamydia 2024 s/p tx and neg rafat 10/2024  Abn pap? n  Pap utd    PMH: No past medical history on file.     PSH:  No past surgical history on file.    MEDS:  Medications Ordered Prior to Encounter[1]    Allergies:    Allergies[2]    Immunizations:  Immunization History   Administered Date(s) Administered    Covid-19 Vaccine Pfizer 30 mcg/0.3 ml 2021, 2021    HPV (Gardasil) 2014    TDAP 2022   Pended Date(s) Pended    Influenza Vaccine, trivalent (IIV3), PF 0.5mL (30273) 2024       Family Hx:   Family History   Problem Relation Age of Onset    Cancer Mother 40        breast cancer       SocialHx:    Social History     Socioeconomic History    Marital status: Single   Tobacco Use    Smoking status: Never    Smokeless tobacco: Never   Vaping Use    Vaping status: Never Used   Substance and Sexual Activity    Alcohol use: Yes     Drug use: Never       Objective:   /72   Pulse 77   Wt 198 lb (89.8 kg)   LMP 2024 (Exact Date)   BMI 29.24 kg/m²   Estimated body mass index is 29.24 kg/m² as calculated from the following:    Height as of 10/11/24: 5' 9\" (1.753 m).    Weight as of this encounter: 198 lb (89.8 kg).    PE:  Gen: alert & oriented, no apparent distress    Fetal Well Being Assessment:    FHT: 140s    Depression Scale      PHQ-2 SCORE: 0  , done 2024          Based on the available data, low-dose aspirin is advised for women at high risk for preeclampsia. There is no consensus on the criteria that confer high risk. The [place] Preventive Services Task Force (USPSTF) risk criteria are reasonable.  USPSTF criteria for high risk include one or more of the following:   Previous pregnancy with preeclampsia, especially early onset and with an adverse outcome   Multifetal gestation  Chronic hypertension   Type 1 or 2 diabetes mellitus  Chronic kidney disease  Autoimmune disease (antiphospholipid syndrome, systemic lupus erythematosus)     Women with multiple moderate risk factors for preeclampsia are considered high risk, as well. Identification of women with an appropriate combination of moderate risk factors to be considered high risk is subjective and determined case by case, as the data describing the magnitude of the association between each of these risk factors and development of preeclampsia vary widely and lack consistency. USPSTF criteria for moderate risk include:  Nulliparity  Obesity (body mass index >30 kg/m2)  Family history of preeclampsia in mother or sister  Age >=35 years  Sociodemographic characteristics (, low socioeconomic level)  Personal risk factors (eg, history of low birth weight or small for gestational age, previous adverse pregnancy outcome, >10 year pregnancy interval)    Assessment/Plan:     Catalina Hodges is a 29 year old  female at 20w5d - establish prenatal  care.    Diagnoses and all orders for this visit:    Encounter for supervision of normal first pregnancy in first trimester (HCC)  -     Antibody Screen; Future  -     Hepatitis B Surface Antigen  -     Blood Type, ABO And Rh D  -     HIV AG AB Combo  -     HCV Antibody  -     US PREG 2ND 3RD TRIMESTER (CPT=76805); Future  -     Fluzone trivalent vaccine, PF 0.5mL, 6mo+ (79744)  -     AFP, Maternal Serum         Dating: c/w LMP    -Aneuploidy screening options discussed  cfDNA/NIPS desires today  -Carrier screening will do today  -Flu vaccine will get today  -COVID-19 vaccination encouraged      BMI 29  -Wt gain goal  discussed.     Disc. adequate nutrition, folic acid supplementation, and PNVs, dietary restrictions, including no raw fish/meat/eggs; mercury - containing fish, unpasteurized dairy/soft cheeses. Ok for 100-200mg caffeine intake.  Disc dry diet and treatment for N/V of early pregnancy, handout given.    SAB precautions; RTC for VB, spotting, cramping, pain or discharge.    Would accept blood products prn  Risk factors for diabetes: early screening in first trimester with fasting glucose and hgb A1c    Pap UTD  Prenatal labs ordered will do via [facility] per insurance   GC/CT -done needs rafat in third trimester  UA, Urine Cx    RTC 4 wk    Mallorie Sewell MD, FACOG  [facility] - OBGYN             [1]   Current Outpatient Medications on File Prior to Visit   Medication Sig Dispense Refill    azithromycin 250 MG Oral Tab 4 tab po today and done (Patient not taking: Reported on 10/11/2024) 4 tablet 0     No current facility-administered medications on file prior to visit.   [2] No Known Allergies

## 2024-12-19 ENCOUNTER — NURSE ONLY (OUTPATIENT)
Dept: OBGYN CLINIC | Facility: CLINIC | Age: 29
End: 2024-12-19
Payer: COMMERCIAL

## 2024-12-19 RX ORDER — CHOLECALCIFEROL (VITAMIN D3) 50 MCG
50 TABLET ORAL DAILY
Qty: 30 TABLET | Refills: 3 | Status: SHIPPED | OUTPATIENT
Start: 2024-12-19

## 2024-12-19 RX ORDER — CALCIUM CARBONATE 500(1250)
1000 TABLET ORAL DAILY
Qty: 30 TABLET | Refills: 3 | Status: SHIPPED | OUTPATIENT
Start: 2024-12-19 | End: 2025-01-18

## 2024-12-19 RX ORDER — CHOLECALCIFEROL (VITAMIN D3) 25 MCG
1 TABLET,CHEWABLE ORAL DAILY
COMMUNITY

## 2024-12-19 RX ORDER — FERROUS SULFATE 325(65) MG
325 TABLET ORAL EVERY OTHER DAY
Qty: 90 TABLET | Refills: 2 | Status: SHIPPED | OUTPATIENT
Start: 2024-12-19

## 2024-12-19 NOTE — PROGRESS NOTES
Pt name and  verified. Pt called today for RN OB Education.     OB History    Para Term  AB Living   1 0 0 0 0 0   SAB IAB Ectopic Multiple Live Births   0 0 0 0 0      LMP: 24    Pre  BMI: 28.05    EPDS score: 0/30    Working JESUS: 25 based on US  Hx of genetic abnormality in family: denies  Hx of varicella: unknown    Sterilization/Contraception: undecided    OUD Screening: Pt. Has answered NO 5P questions and has NO  risk factors.    Pt. Given What pregnant women need to know handout.        SDOH Screening: low risk    Educational material reviewed with patient: Prenatal care, nutrition, weight gain recommendations, travel, exercise, intercourse, pregnancy changes, safe medications, pregnancy and work, fetal movement, labor and  labor, warning signs, food safety, tdap, cord blood, breastfeeding- both, circumcision- yes, and Group B strep.     Blood transfusion if needed: consents    PN labs: ordered on     Iron Supplementation (325 mg every other day): sent to pharmacy  Vitamin D (2,000 IUs daily): sent to pharmacy  Calcium (1 gram Daily): sent to pharmacy    Optional genetic screening labs were reviewed: Cell FreeDNA, FTS with US, Quad screen MSAFP and CF screening. Pt completed.     [facility] Policy: discussed    NOB apt:  25 with Dr. Sewell

## 2024-12-23 ENCOUNTER — TELEPHONE (OUTPATIENT)
Dept: OBGYN CLINIC | Facility: CLINIC | Age: 29
End: 2024-12-23

## 2024-12-23 NOTE — TELEPHONE ENCOUNTER
Incoming Fax received from [facility] with patient's NIPT test results.    Sample collection date: 12/17/24  Report date: 12/22/24    Results: Low Risk   Low Risk for Aneuploidies and Microdeletions  Fetal Sex: In snapshot  Fetal Fraction: 12.7%  Routing to ordering provider.

## 2024-12-24 LAB
AFP MOM: 1.4
AFP, SERUM: 69.8 NG/ML
CALC'D GESTATIONAL AGE: 20.3 WEEKS
DONOR EGG: NO
HX OF NEURAL TUBE DEFECTS: NO
INSULIN DEPEND DIABETIC: NO
MATERNAL WEIGHT: 198 LBS
NUMBER OF FETUSES: 1
PREV PREGNANCY DOWN SYND: NO
REPEAT SPECIMEN: NO

## 2024-12-26 ENCOUNTER — TELEPHONE (OUTPATIENT)
Dept: OBGYN CLINIC | Facility: CLINIC | Age: 29
End: 2024-12-26

## 2024-12-26 NOTE — TELEPHONE ENCOUNTER
Patient asking for copy of genetic results to be picked up at the [facility] l;ocation    Pls call patient when ready for pickup.

## 2025-01-16 ENCOUNTER — HOSPITAL ENCOUNTER (OUTPATIENT)
Dept: ULTRASOUND IMAGING | Facility: HOSPITAL | Age: 30
Discharge: HOME | End: 2025-01-16
Attending: STUDENT IN AN ORGANIZED HEALTH CARE EDUCATION/TRAINING PROGRAM
Payer: COMMERCIAL

## 2025-01-16 DIAGNOSIS — Z34.01 ENCOUNTER FOR SUPERVISION OF NORMAL FIRST PREGNANCY IN FIRST TRIMESTER (HCC): ICD-10-CM

## 2025-01-16 PROCEDURE — 76805 OB US >/= 14 WKS SNGL FETUS: CPT | Performed by: STUDENT IN AN ORGANIZED HEALTH CARE EDUCATION/TRAINING PROGRAM

## 2025-01-20 ENCOUNTER — HOSPITAL ENCOUNTER (OUTPATIENT)
Dept: ULTRASOUND IMAGING | Facility: HOSPITAL | Age: 30
Discharge: HOME | End: 2025-01-20
Attending: STUDENT IN AN ORGANIZED HEALTH CARE EDUCATION/TRAINING PROGRAM
Payer: COMMERCIAL

## 2025-01-20 DIAGNOSIS — Z34.01 ENCOUNTER FOR SUPERVISION OF NORMAL FIRST PREGNANCY IN FIRST TRIMESTER (HCC): ICD-10-CM

## 2025-01-21 ENCOUNTER — ROUTINE PRENATAL (OUTPATIENT)
Dept: OBGYN CLINIC | Facility: CLINIC | Age: 30
End: 2025-01-21
Payer: COMMERCIAL

## 2025-01-21 VITALS
SYSTOLIC BLOOD PRESSURE: 118 MMHG | BODY MASS INDEX: 30 KG/M2 | DIASTOLIC BLOOD PRESSURE: 75 MMHG | HEART RATE: 83 BPM | WEIGHT: 204 LBS

## 2025-01-21 DIAGNOSIS — Z34.01 ENCOUNTER FOR SUPERVISION OF NORMAL FIRST PREGNANCY IN FIRST TRIMESTER (HCC): Primary | ICD-10-CM

## 2025-01-21 NOTE — PROGRESS NOTES
Catalina Hodges is a 29 year old  at 25w5d here for robv. Doing well. No complaints. +FM. Will do third tri labs. Discussed tdap next visit. Rtc in 3wk.

## 2025-02-11 LAB
ABSOLUTE BASOPHILS: 12 CELLS/UL (ref 0–200)
ABSOLUTE EOSINOPHILS: 90 CELLS/UL (ref 15–500)
ABSOLUTE LYMPHOCYTES: 1704 CELLS/UL (ref 850–3900)
ABSOLUTE MONOCYTES: 432 CELLS/UL (ref 200–950)
ABSOLUTE NEUTROPHILS: 3762 CELLS/UL (ref 1500–7800)
BASOPHILS: 0.2 %
EOSINOPHILS: 1.5 %
FERRITIN: 11 NG/ML (ref 16–154)
GLUCOSE, GESTATIONAL SCREEN (50G)-130 CUTOFF: 109 MG/DL
HEMATOCRIT: 37.3 % (ref 35–45)
HEMOGLOBIN: 11.7 G/DL (ref 11.7–15.5)
LYMPHOCYTES: 28.4 %
MCH: 26.5 PG (ref 27–33)
MCHC: 31.4 G/DL (ref 32–36)
MCV: 84.6 FL (ref 80–100)
MONOCYTES: 7.2 %
MPV: 11.9 FL (ref 7.5–12.5)
NEUTROPHILS: 62.7 %
PLATELET COUNT: 188 THOUSAND/UL (ref 140–400)
RDW: 13 % (ref 11–15)
RED BLOOD CELL COUNT: 4.41 MILLION/UL (ref 3.8–5.1)
T. PALLIDUM AB, EIA: NEGATIVE
WHITE BLOOD CELL COUNT: 6 THOUSAND/UL (ref 3.8–10.8)

## 2025-02-18 ENCOUNTER — ROUTINE PRENATAL (OUTPATIENT)
Dept: OBGYN CLINIC | Facility: CLINIC | Age: 30
End: 2025-02-18
Payer: COMMERCIAL

## 2025-02-18 VITALS
DIASTOLIC BLOOD PRESSURE: 75 MMHG | WEIGHT: 207 LBS | BODY MASS INDEX: 31 KG/M2 | SYSTOLIC BLOOD PRESSURE: 126 MMHG | HEART RATE: 79 BPM

## 2025-02-18 DIAGNOSIS — Z34.01 ENCOUNTER FOR SUPERVISION OF NORMAL FIRST PREGNANCY IN FIRST TRIMESTER (HCC): Primary | ICD-10-CM

## 2025-02-18 PROCEDURE — 90471 IMMUNIZATION ADMIN: CPT | Performed by: STUDENT IN AN ORGANIZED HEALTH CARE EDUCATION/TRAINING PROGRAM

## 2025-02-18 PROCEDURE — 90715 TDAP VACCINE 7 YRS/> IM: CPT | Performed by: STUDENT IN AN ORGANIZED HEALTH CARE EDUCATION/TRAINING PROGRAM

## 2025-02-18 NOTE — PROGRESS NOTES
Catalina Hodges is a 29 year old  at 29w5d here for robv. Doing well. Denies uc, lof or vb. +FM. Tdap today. Will schedule 36wk growth scan. Rtc in 2wk.

## 2025-03-04 ENCOUNTER — ROUTINE PRENATAL (OUTPATIENT)
Dept: OBGYN CLINIC | Facility: CLINIC | Age: 30
End: 2025-03-04
Payer: COMMERCIAL

## 2025-03-04 VITALS
DIASTOLIC BLOOD PRESSURE: 74 MMHG | SYSTOLIC BLOOD PRESSURE: 114 MMHG | WEIGHT: 207 LBS | BODY MASS INDEX: 31 KG/M2 | HEART RATE: 74 BPM

## 2025-03-04 DIAGNOSIS — Z34.03 ENCOUNTER FOR SUPERVISION OF NORMAL FIRST PREGNANCY IN THIRD TRIMESTER (HCC): Primary | ICD-10-CM

## 2025-03-04 NOTE — PROGRESS NOTES
Got TDaP previously. No complaints. Needs to make US appt for 36 wga. Taking PNV, Fe, and ASA. Plans mixed feeding, goal to BF. Needs to order pump. Has not yet thought about pediatrician, postpartum contraception; encouraged starting to consider. Had chlamydia in pregnancy with negative SHANTA; consider 3T repeat next visit.

## 2025-03-18 ENCOUNTER — ROUTINE PRENATAL (OUTPATIENT)
Dept: OBGYN CLINIC | Facility: CLINIC | Age: 30
End: 2025-03-18
Payer: COMMERCIAL

## 2025-03-18 VITALS
BODY MASS INDEX: 31 KG/M2 | DIASTOLIC BLOOD PRESSURE: 71 MMHG | WEIGHT: 209 LBS | SYSTOLIC BLOOD PRESSURE: 104 MMHG | HEART RATE: 77 BPM

## 2025-03-18 DIAGNOSIS — Z34.83 ENCOUNTER FOR SUPERVISION OF OTHER NORMAL PREGNANCY IN THIRD TRIMESTER (HCC): Primary | ICD-10-CM

## 2025-04-01 ENCOUNTER — ROUTINE PRENATAL (OUTPATIENT)
Dept: OBGYN CLINIC | Facility: CLINIC | Age: 30
End: 2025-04-01
Payer: COMMERCIAL

## 2025-04-01 VITALS
HEART RATE: 80 BPM | BODY MASS INDEX: 31 KG/M2 | DIASTOLIC BLOOD PRESSURE: 71 MMHG | SYSTOLIC BLOOD PRESSURE: 105 MMHG | WEIGHT: 211 LBS

## 2025-04-01 DIAGNOSIS — Z34.83 ENCOUNTER FOR SUPERVISION OF OTHER NORMAL PREGNANCY IN THIRD TRIMESTER (HCC): Primary | ICD-10-CM

## 2025-04-01 NOTE — PROGRESS NOTES
Catalina Hodges is a 29 year old female  Patient's last menstrual period was 2024 (exact date).   Chief Complaint   Patient presents with    Prenatal Care     Good martínez Molina cts.  No c/o.   OBSTETRICS HISTORY:     OB History    Para Term  AB Living   1             SAB IAB Ectopic Multiple Live Births                  # Outcome Date GA Lbr Pravin/2nd Weight Sex Type Anes PTL Lv   1 Current                GYNE HISTORY:         Menarche: 14 years (2024 10:29 AM)  Period Cycle (Days): 28 days (2024 10:29 AM)  Period Duration (Days): 3-4 days (2024 10:29 AM)  Period Flow: moderate (2024 10:29 AM)  Use of Birth Control (if yes, specify type): None (2024 10:29 AM)  Hx Prior Abnormal Pap: No (2024 10:29 AM)  Pap Date: 24 (2024 10:29 AM)  Pap Result Notes: WNL (2024 10:29 AM)        Latest Ref Rng & Units 2024     9:53 AM 2022     1:45 PM 2019     2:35 PM   RECENT PAP RESULTS   INTERPRETATION/RESULT:  --  --  Negative for intraepithelial lesion or malignancy    HPV Negative   Negative          MEDICAL HISTORY:     No past medical history on file.    SURGICAL HISTORY:     No past surgical history on file.    SOCIAL HISTORY:     Social History     Socioeconomic History    Marital status: Single   Tobacco Use    Smoking status: Never    Smokeless tobacco: Never   Vaping Use    Vaping status: Never Used   Substance and Sexual Activity    Alcohol use: Not Currently    Drug use: Never     Social Drivers of Health     Food Insecurity: No Food Insecurity (2024)    Food Insecurity     Food Insecurity: Never true   Transportation Needs: No Transportation Needs (2024)    Transportation Needs     Lack of Transportation: No   Stress: No Stress Concern Present (2024)    Stress     Feeling of Stress : No   Housing Stability: Low Risk  (2024)    Housing Stability     Housing Instability: No        FAMILY HISTORY:     Family  History   Problem Relation Age of Onset    Cancer Mother 40        breast cancer    No Known Problems Maternal Grandmother     No Known Problems Maternal Grandfather     Diabetes Paternal Grandmother     High Blood Pressure Paternal Grandfather        MEDICATIONS:       Current Outpatient Medications:     prenatal vitamin with DHA 27-0.8-228 MG Oral Cap, Take 1 capsule by mouth daily., Disp: , Rfl:     Cholecalciferol (VITAMIN D) 50 MCG (2000 UT) Oral Tab, Take 50 mcg by mouth daily., Disp: 30 tablet, Rfl: 3    Ferrous Sulfate 325 (65 Fe) MG Oral Tab, Take 1 tablet (325 mg total) by mouth every other day., Disp: 90 tablet, Rfl: 2    ALLERGIES:     Allergies[1]      REVIEW OF SYSTEMS:     Constitutional:    denies fever / chills  Cardiovascular:  denies chest pain or palpitations  Respiratory:    denies shortness of breath  Gastrointestinal:  denies severe abdominal pain, frequent diarrhea or constipation, nausea / vomiting  Genitourinary:    denies dysuria, bothersome incontinence  Skin/Breast:   denies any breast pain, lumps, or discharge  Neurological:    denies frequent severe headaches  Psychiatric:   denies depression or anxiety, thoughts of harming self or others      PHYSICAL EXAM:   Blood pressure 105/71, pulse 80, weight 211 lb (95.7 kg), last menstrual period 07/25/2024, not currently breastfeeding.  Constitutional:  well developed, well nourished, no distress  Abdomen:   soft, gravid, nontender  Musculoskeletal: no cva tenderness bilaterally  Skin/Hair:  no unusual rashes or bruises  Extremities:  no edema, no cyanosis, non tender bilaterally  Psychiatric:   oriented to time, place, person and situation. Appropriate mood and affect      ASSESSMENT & PLAN:     There are no diagnoses linked to this encounter.      FOLLOW-UP     No follow-ups on file.      Rehan Buckley MD  4/1/2025         [1] No Known Allergies

## 2025-04-03 NOTE — PROGRESS NOTES
Catalina Hodges is a 29 year old female  Patient's last menstrual period was 2024 (exact date). No chief complaint on file.    Good fm. Kick cts.    OBSTETRICS HISTORY:     OB History    Para Term  AB Living   1             SAB IAB Ectopic Multiple Live Births                  # Outcome Date GA Lbr Pravin/2nd Weight Sex Type Anes PTL Lv   1 Current                GYNE HISTORY:         Menarche: 14 years (2024 10:29 AM)  Period Cycle (Days): 28 days (2024 10:29 AM)  Period Duration (Days): 3-4 days (2024 10:29 AM)  Period Flow: moderate (2024 10:29 AM)  Use of Birth Control (if yes, specify type): None (2024 10:29 AM)  Hx Prior Abnormal Pap: No (2024 10:29 AM)  Pap Date: 24 (2024 10:29 AM)  Pap Result Notes: WNL (2024 10:29 AM)        Latest Ref Rng & Units 2024     9:53 AM 2022     1:45 PM 2019     2:35 PM   RECENT PAP RESULTS   INTERPRETATION/RESULT:  --  --  Negative for intraepithelial lesion or malignancy    HPV Negative   Negative          MEDICAL HISTORY:     No past medical history on file.    SURGICAL HISTORY:     No past surgical history on file.    SOCIAL HISTORY:     Social History     Socioeconomic History    Marital status: Single   Tobacco Use    Smoking status: Never    Smokeless tobacco: Never   Vaping Use    Vaping status: Never Used   Substance and Sexual Activity    Alcohol use: Not Currently    Drug use: Never     Social Drivers of Health     Food Insecurity: No Food Insecurity (2024)    Food Insecurity     Food Insecurity: Never true   Transportation Needs: No Transportation Needs (2024)    Transportation Needs     Lack of Transportation: No   Stress: No Stress Concern Present (2024)    Stress     Feeling of Stress : No   Housing Stability: Low Risk  (2024)    Housing Stability     Housing Instability: No        FAMILY HISTORY:     Family History   Problem Relation Age of Onset     Cancer Mother 40        breast cancer    No Known Problems Maternal Grandmother     No Known Problems Maternal Grandfather     Diabetes Paternal Grandmother     High Blood Pressure Paternal Grandfather        MEDICATIONS:       Current Outpatient Medications:     prenatal vitamin with DHA 27-0.8-228 MG Oral Cap, Take 1 capsule by mouth daily., Disp: , Rfl:     Cholecalciferol (VITAMIN D) 50 MCG (2000 UT) Oral Tab, Take 50 mcg by mouth daily., Disp: 30 tablet, Rfl: 3    Ferrous Sulfate 325 (65 Fe) MG Oral Tab, Take 1 tablet (325 mg total) by mouth every other day., Disp: 90 tablet, Rfl: 2    ALLERGIES:     Allergies[1]      REVIEW OF SYSTEMS:     Constitutional:    denies fever / chills  Cardiovascular:  denies chest pain or palpitations  Respiratory:    denies shortness of breath  Gastrointestinal:  denies severe abdominal pain, frequent diarrhea or constipation, nausea / vomiting  Genitourinary:    denies dysuria, bothersome incontinence  Skin/Breast:   denies any breast pain, lumps, or discharge  Neurological:    denies frequent severe headaches  Psychiatric:   denies depression or anxiety, thoughts of harming self or others      PHYSICAL EXAM:   Blood pressure 104/71, pulse 77, weight 209 lb (94.8 kg), last menstrual period 07/25/2024, not currently breastfeeding.  Constitutional:  well developed, well nourished, no distress  Abdomen:   soft, gravid, nontender  Musculoskeletal: no cva tenderness bilaterally  Skin/Hair:  no unusual rashes or bruises  Extremities:  no edema, no cyanosis, non tender bilaterally  Psychiatric:   oriented to time, place, person and situation. Appropriate mood and affect      ASSESSMENT & PLAN:     There are no diagnoses linked to this encounter.      FOLLOW-UP     No follow-ups on file.      Rehan Buckley MD  4/3/2025         [1] No Known Allergies

## 2025-04-08 ENCOUNTER — TELEPHONE (OUTPATIENT)
Dept: OBGYN CLINIC | Facility: CLINIC | Age: 30
End: 2025-04-08

## 2025-04-10 ENCOUNTER — HOSPITAL ENCOUNTER (OUTPATIENT)
Dept: ULTRASOUND IMAGING | Facility: HOSPITAL | Age: 30
Discharge: HOME | End: 2025-04-10
Attending: STUDENT IN AN ORGANIZED HEALTH CARE EDUCATION/TRAINING PROGRAM
Payer: COMMERCIAL

## 2025-04-10 ENCOUNTER — ROUTINE PRENATAL (OUTPATIENT)
Dept: OBGYN CLINIC | Facility: CLINIC | Age: 30
End: 2025-04-10
Payer: COMMERCIAL

## 2025-04-10 VITALS
DIASTOLIC BLOOD PRESSURE: 73 MMHG | WEIGHT: 210 LBS | BODY MASS INDEX: 31 KG/M2 | HEART RATE: 77 BPM | SYSTOLIC BLOOD PRESSURE: 109 MMHG

## 2025-04-10 DIAGNOSIS — O09.899: ICD-10-CM

## 2025-04-10 DIAGNOSIS — Z34.03 ENCOUNTER FOR SUPERVISION OF NORMAL FIRST PREGNANCY IN THIRD TRIMESTER (HCC): Primary | ICD-10-CM

## 2025-04-10 DIAGNOSIS — Z34.01 ENCOUNTER FOR SUPERVISION OF NORMAL FIRST PREGNANCY IN FIRST TRIMESTER (HCC): ICD-10-CM

## 2025-04-10 PROCEDURE — 76816 OB US FOLLOW-UP PER FETUS: CPT | Performed by: STUDENT IN AN ORGANIZED HEALTH CARE EDUCATION/TRAINING PROGRAM

## 2025-04-10 NOTE — PROGRESS NOTES
Normal fetal movement. No VB/LOF. No regular/painful cramping/ctx concerning for labor. Desires expectant management, no 39 wk elective IOL. Has ordered pump. Needs to find pediatrician. Has not considered pp contraception, but wants to be on something. Has never previously taken contraception. Swabs collected, SVE deferred. Had growth US today; report not yet available but will review regarding fetal presentation and EFW given no change in FH.

## 2025-04-11 LAB
CHLAMYDIA TRACHOMATIS$RNA, TMA: NOT DETECTED
NEISSERIA GONORRHOEAE$RNA, TMA: NOT DETECTED

## 2025-04-12 LAB — GROUP B STREP BY PCR FOR PCR OVT: NEGATIVE

## 2025-04-15 ENCOUNTER — ROUTINE PRENATAL (OUTPATIENT)
Dept: OBGYN CLINIC | Facility: CLINIC | Age: 30
End: 2025-04-15
Payer: COMMERCIAL

## 2025-04-15 VITALS — DIASTOLIC BLOOD PRESSURE: 77 MMHG | BODY MASS INDEX: 32 KG/M2 | SYSTOLIC BLOOD PRESSURE: 125 MMHG | WEIGHT: 215.5 LBS

## 2025-04-15 DIAGNOSIS — Z34.83 ENCOUNTER FOR SUPERVISION OF OTHER NORMAL PREGNANCY IN THIRD TRIMESTER (HCC): Primary | ICD-10-CM

## 2025-04-21 ENCOUNTER — ROUTINE PRENATAL (OUTPATIENT)
Dept: OBGYN CLINIC | Facility: CLINIC | Age: 30
End: 2025-04-21
Payer: COMMERCIAL

## 2025-04-21 VITALS
WEIGHT: 217 LBS | HEART RATE: 80 BPM | BODY MASS INDEX: 32 KG/M2 | DIASTOLIC BLOOD PRESSURE: 69 MMHG | SYSTOLIC BLOOD PRESSURE: 104 MMHG

## 2025-04-21 DIAGNOSIS — Z34.03 ENCOUNTER FOR SUPERVISION OF NORMAL FIRST PREGNANCY IN THIRD TRIMESTER (HCC): Primary | ICD-10-CM

## 2025-04-21 NOTE — PROGRESS NOTES
Normal fetal movement. No VB/LOF. No regular/painful cramping/ctx concerning for labor. No other sxs/complaints.   Had growth US on 4/10 and 21st percentile.     Fundal height 37 cm,  bpm,     C/30/-3, post, mid consistency    Recommend IOL by 40 wga given fundal height change.   Likely pills.

## 2025-04-28 ENCOUNTER — ROUTINE PRENATAL (OUTPATIENT)
Dept: OBGYN CLINIC | Facility: CLINIC | Age: 30
End: 2025-04-28
Payer: COMMERCIAL

## 2025-04-28 VITALS — WEIGHT: 213 LBS | BODY MASS INDEX: 31 KG/M2

## 2025-04-28 DIAGNOSIS — Z34.03 ENCOUNTER FOR SUPERVISION OF NORMAL FIRST PREGNANCY IN THIRD TRIMESTER (HCC): Primary | ICD-10-CM

## 2025-04-28 NOTE — PROGRESS NOTES
Catalina Hodges is a  at 39w4d here for BG visit. Normal fetal movement. No VB/LOF. No regular/painful cramping/ctx concerning for labor. IOL for 40 wga --> message Josefina.

## 2025-05-02 ENCOUNTER — TELEPHONE (OUTPATIENT)
Dept: OBGYN UNIT | Facility: HOSPITAL | Age: 30
End: 2025-05-02

## 2025-05-03 ENCOUNTER — APPOINTMENT (OUTPATIENT)
Dept: OBGYN CLINIC | Facility: HOSPITAL | Age: 30
End: 2025-05-03
Payer: COMMERCIAL

## 2025-05-03 ENCOUNTER — HOSPITAL ENCOUNTER (INPATIENT)
Facility: HOSPITAL | Age: 30
LOS: 4 days | Discharge: HOME | End: 2025-05-07
Attending: OBSTETRICS & GYNECOLOGY | Admitting: OBSTETRICS & GYNECOLOGY
Payer: COMMERCIAL

## 2025-05-03 PROBLEM — Z34.90 PREGNANCY (HCC): Status: ACTIVE | Noted: 2025-05-03

## 2025-05-03 LAB
ANTIBODY SCREEN: NEGATIVE
BASOPHILS # BLD AUTO: 0.02 X10(3) UL (ref 0–0.2)
BASOPHILS NFR BLD AUTO: 0.4 %
DEPRECATED RDW RBC AUTO: 39.4 FL (ref 35.1–46.3)
EOSINOPHIL # BLD AUTO: 0.08 X10(3) UL (ref 0–0.7)
EOSINOPHIL NFR BLD AUTO: 1.4 %
ERYTHROCYTE [DISTWIDTH] IN BLOOD BY AUTOMATED COUNT: 13.8 % (ref 11–15)
HCT VFR BLD AUTO: 38.5 % (ref 35–48)
HGB BLD-MCNC: 12.8 G/DL (ref 12–16)
IMM GRANULOCYTES # BLD AUTO: 0.02 X10(3) UL (ref 0–1)
IMM GRANULOCYTES NFR BLD: 0.4 %
LYMPHOCYTES # BLD AUTO: 1.57 X10(3) UL (ref 1–4)
LYMPHOCYTES NFR BLD AUTO: 28.1 %
MCH RBC QN AUTO: 26.2 PG (ref 26–34)
MCHC RBC AUTO-ENTMCNC: 33.2 G/DL (ref 31–37)
MCV RBC AUTO: 78.7 FL (ref 80–100)
MONOCYTES # BLD AUTO: 0.42 X10(3) UL (ref 0.1–1)
MONOCYTES NFR BLD AUTO: 7.5 %
NEUTROPHILS # BLD AUTO: 3.48 X10 (3) UL (ref 1.5–7.7)
NEUTROPHILS # BLD AUTO: 3.48 X10(3) UL (ref 1.5–7.7)
NEUTROPHILS NFR BLD AUTO: 62.2 %
PLATELET # BLD AUTO: 214 10(3)UL (ref 150–450)
RBC # BLD AUTO: 4.89 X10(6)UL (ref 3.8–5.3)
RH BLOOD TYPE: POSITIVE
RH BLOOD TYPE: POSITIVE
T PALLIDUM AB SER QL IA: NONREACTIVE
WBC # BLD AUTO: 5.6 X10(3) UL (ref 4–11)

## 2025-05-03 PROCEDURE — 3E0P7VZ INTRODUCTION OF HORMONE INTO FEMALE REPRODUCTIVE, VIA NATURAL OR ARTIFICIAL OPENING: ICD-10-PCS | Performed by: OBSTETRICS & GYNECOLOGY

## 2025-05-03 RX ORDER — IBUPROFEN 600 MG/1
600 TABLET, FILM COATED ORAL ONCE AS NEEDED
Status: DISCONTINUED | OUTPATIENT
Start: 2025-05-03 | End: 2025-05-05 | Stop reason: HOSPADM

## 2025-05-03 RX ORDER — DEXTROSE, SODIUM CHLORIDE, SODIUM LACTATE, POTASSIUM CHLORIDE, AND CALCIUM CHLORIDE 5; .6; .31; .03; .02 G/100ML; G/100ML; G/100ML; G/100ML; G/100ML
INJECTION, SOLUTION INTRAVENOUS CONTINUOUS
Status: DISCONTINUED | OUTPATIENT
Start: 2025-05-03 | End: 2025-05-03

## 2025-05-03 RX ORDER — ACETAMINOPHEN 500 MG
1000 TABLET ORAL EVERY 6 HOURS PRN
Status: DISCONTINUED | OUTPATIENT
Start: 2025-05-03 | End: 2025-05-05

## 2025-05-03 RX ORDER — LIDOCAINE HYDROCHLORIDE 10 MG/ML
30 INJECTION, SOLUTION EPIDURAL; INFILTRATION; INTRACAUDAL; PERINEURAL ONCE
Status: DISCONTINUED | OUTPATIENT
Start: 2025-05-03 | End: 2025-05-05 | Stop reason: HOSPADM

## 2025-05-03 RX ORDER — ACETAMINOPHEN 500 MG
500 TABLET ORAL EVERY 6 HOURS PRN
Status: DISCONTINUED | OUTPATIENT
Start: 2025-05-03 | End: 2025-05-05

## 2025-05-03 RX ORDER — ONDANSETRON 2 MG/ML
4 INJECTION INTRAMUSCULAR; INTRAVENOUS EVERY 6 HOURS PRN
Status: DISCONTINUED | OUTPATIENT
Start: 2025-05-03 | End: 2025-05-05 | Stop reason: HOSPADM

## 2025-05-03 RX ORDER — NALBUPHINE HYDROCHLORIDE 10 MG/ML
10 INJECTION INTRAMUSCULAR; INTRAVENOUS; SUBCUTANEOUS EVERY 6 HOURS PRN
Status: DISCONTINUED | OUTPATIENT
Start: 2025-05-03 | End: 2025-05-05 | Stop reason: HOSPADM

## 2025-05-03 RX ORDER — CITRIC ACID/SODIUM CITRATE 334-500MG
30 SOLUTION, ORAL ORAL AS NEEDED
Status: DISCONTINUED | OUTPATIENT
Start: 2025-05-03 | End: 2025-05-05 | Stop reason: HOSPADM

## 2025-05-03 RX ORDER — SODIUM CHLORIDE, SODIUM LACTATE, POTASSIUM CHLORIDE, CALCIUM CHLORIDE 600; 310; 30; 20 MG/100ML; MG/100ML; MG/100ML; MG/100ML
INJECTION, SOLUTION INTRAVENOUS AS NEEDED
Status: DISCONTINUED | OUTPATIENT
Start: 2025-05-03 | End: 2025-05-03

## 2025-05-03 RX ORDER — HYDROXYZINE HYDROCHLORIDE 50 MG/ML
50 INJECTION, SOLUTION INTRAMUSCULAR EVERY 6 HOURS PRN
Status: DISCONTINUED | OUTPATIENT
Start: 2025-05-03 | End: 2025-05-05 | Stop reason: HOSPADM

## 2025-05-03 RX ORDER — DEXTROSE, SODIUM CHLORIDE, SODIUM LACTATE, POTASSIUM CHLORIDE, AND CALCIUM CHLORIDE 5; .6; .31; .03; .02 G/100ML; G/100ML; G/100ML; G/100ML; G/100ML
INJECTION, SOLUTION INTRAVENOUS CONTINUOUS PRN
Status: DISCONTINUED | OUTPATIENT
Start: 2025-05-03 | End: 2025-05-05 | Stop reason: HOSPADM

## 2025-05-03 RX ORDER — TERBUTALINE SULFATE 1 MG/ML
0.25 INJECTION SUBCUTANEOUS AS NEEDED
Status: DISCONTINUED | OUTPATIENT
Start: 2025-05-03 | End: 2025-05-05 | Stop reason: HOSPADM

## 2025-05-03 RX ORDER — SODIUM CHLORIDE, SODIUM LACTATE, POTASSIUM CHLORIDE, CALCIUM CHLORIDE 600; 310; 30; 20 MG/100ML; MG/100ML; MG/100ML; MG/100ML
INJECTION, SOLUTION INTRAVENOUS CONTINUOUS
Status: DISCONTINUED | OUTPATIENT
Start: 2025-05-03 | End: 2025-05-05 | Stop reason: HOSPADM

## 2025-05-04 ENCOUNTER — ANESTHESIA EVENT (OUTPATIENT)
Dept: OBGYN UNIT | Facility: HOSPITAL | Age: 30
End: 2025-05-04
Payer: COMMERCIAL

## 2025-05-04 ENCOUNTER — ANESTHESIA (OUTPATIENT)
Dept: OBGYN UNIT | Facility: HOSPITAL | Age: 30
End: 2025-05-04
Payer: COMMERCIAL

## 2025-05-04 PROCEDURE — 3E033VJ INTRODUCTION OF OTHER HORMONE INTO PERIPHERAL VEIN, PERCUTANEOUS APPROACH: ICD-10-PCS | Performed by: OBSTETRICS & GYNECOLOGY

## 2025-05-04 RX ORDER — LIDOCAINE HYDROCHLORIDE 10 MG/ML
INJECTION, SOLUTION INFILTRATION; PERINEURAL
Status: COMPLETED | OUTPATIENT
Start: 2025-05-04 | End: 2025-05-04

## 2025-05-04 RX ORDER — LIDOCAINE HYDROCHLORIDE AND EPINEPHRINE 15; 5 MG/ML; UG/ML
INJECTION, SOLUTION EPIDURAL AS NEEDED
Status: DISCONTINUED | OUTPATIENT
Start: 2025-05-04 | End: 2025-05-05 | Stop reason: SURG

## 2025-05-04 RX ORDER — BUPIVACAINE HYDROCHLORIDE 2.5 MG/ML
20 INJECTION, SOLUTION EPIDURAL; INFILTRATION; INTRACAUDAL; PERINEURAL ONCE
Status: COMPLETED | OUTPATIENT
Start: 2025-05-04 | End: 2025-05-04

## 2025-05-04 RX ORDER — BUPIVACAINE HCL/0.9 % NACL/PF 0.25 %
5 PLASTIC BAG, INJECTION (ML) EPIDURAL AS NEEDED
Status: DISCONTINUED | OUTPATIENT
Start: 2025-05-04 | End: 2025-05-06

## 2025-05-04 RX ORDER — NALBUPHINE HYDROCHLORIDE 10 MG/ML
2.5 INJECTION INTRAMUSCULAR; INTRAVENOUS; SUBCUTANEOUS
Status: DISCONTINUED | OUTPATIENT
Start: 2025-05-04 | End: 2025-05-06

## 2025-05-04 RX ADMIN — LIDOCAINE HYDROCHLORIDE AND EPINEPHRINE 3 ML: 15; 5 INJECTION, SOLUTION EPIDURAL at 17:35:00

## 2025-05-04 RX ADMIN — BUPIVACAINE HYDROCHLORIDE 3 ML: 2.5 INJECTION, SOLUTION EPIDURAL; INFILTRATION; INTRACAUDAL; PERINEURAL at 17:37:00

## 2025-05-04 RX ADMIN — LIDOCAINE HYDROCHLORIDE 3 ML: 10 INJECTION, SOLUTION INFILTRATION; PERINEURAL at 17:30:00

## 2025-05-04 NOTE — H&P
[facility]  part of [facility]    History & Physical    Catalina Hodges Patient Status:  Inpatient    1995 MRN [id number]   Location [facility] Attending Rehan Buckley MD   Hosp Day # 1 PCP SARY RAWLS MD     Date of Admission:  5/3/2025      HPI:   Catalina Hodges is a 29 year old  female, current EGA of 40w3d with an estimated date of delivery of: Estimated Date of Delivery: 25      Catalina Hodges is being admitted for induction of labor.    Her current obstetrical history is significant for  postdates.  .    Patient reports good fetal movement .     Fetal Movement: normal.     History   Obstetric History:   OB History    Para Term  AB Living   1        SAB IAB Ectopic Multiple Live Births             # Outcome Date GA Lbr Pravin/2nd Weight Sex Type Anes PTL Lv   1 Current              Past Medical History: Past Medical History[1]  Past Social History: Past Surgical History[2]  Family History: Family History[3]  Social History:   Social History     Tobacco Use    Smoking status: Never    Smokeless tobacco: Never   Substance Use Topics    Alcohol use: Not Currently        Allergies/Medications:   Allergies:   Allergies[4]  Medications:  Prescriptions Prior to Admission[5]    Review of Systems:   As documented in HPI    good fetal movement    Physical Exam:   Temp:  [98 °F (36.7 °C)-98.8 °F (37.1 °C)] 98.1 °F (36.7 °C)  Pulse:  [69-81] 80  Resp:  [16-18] 16  BP: (109-117)/(67-74) 117/67    Constitutional: alert, appears stated age, and cooperative  Respiratory: clear to auscultation bilaterally  Cardiac: regular rate and rhythm, S1, S2 normal, no murmur, click, rub or gallop  Abdomen: FHT present  Fetal Surveillance:  reactive nst.    Sterile vaginal exam:  def      Results:     Lab Results   Component Value Date    TREPONEMALAB Nonreactive 2025    ABO O 2025    RH Positive 2025    WBC 5.6 2025    HGB 12.8  05/03/2025    HCT 38.5 05/03/2025    .0 05/03/2025    CREATSERUM 0.67 09/11/2024    BUN 7 09/11/2024     09/11/2024    K 4.3 09/11/2024     09/11/2024    CO2 25 09/11/2024    GLU 87 09/11/2024    CA 9.6 09/11/2024    ALB 4.3 09/11/2024    ALKPHO 98 09/11/2024    BILT 0.5 09/11/2024    TP 7.9 09/11/2024    AST 14 09/11/2024    ALT 9 09/11/2024    TSH 1.500 11/09/2019       Lab Results   Component Value Date    COLORUR YELLOW 09/11/2024    CLARITY CLEAR 09/11/2024    SPECGRAVITY 1.011 09/11/2024    PROUR NEGATIVE 09/11/2024    GLUUR NEGATIVE 09/11/2024    KETUR TRACE (A) 09/11/2024    BILUR NEGATIVE 09/11/2024    NITRITE NEGATIVE 09/11/2024    LEUUR NEGATIVE 09/11/2024       No results found.      Assessment/Plan:    Catalina Hodges is at an estimated gestational age of 40w3d with an estimated date of delivery of:  Estimated Date of Delivery: 5/1/25    Not in labor.  Obstetrical history significant for  postdates. .    Admission problem(s):    Pregnancy (HCC)  Pt with IOL for postdates.  Pt counseled and all questions answered.           Risks, benefits, alternatives and possible complications have been discussed in detail with the patient.   Pre-admission, admission, and post admission procedures and expectations were discussed in detail.    All questions answered, all appropriate consents will be signed at the Hospital. Admission is planned for today.   Continue present management..    Rehan Buckley MD  5/4/2025  9:22 AM       [1] History reviewed. No pertinent past medical history.  [2] History reviewed. No pertinent surgical history.  [3]   Family History  Problem Relation Age of Onset    Cancer Mother 40        breast cancer    No Known Problems Maternal Grandmother     No Known Problems Maternal Grandfather     Diabetes Paternal Grandmother     High Blood Pressure Paternal Grandfather    [4] No Known Allergies  [5]   Medications Prior to Admission   Medication Sig Dispense Refill Last  Dose/Taking    prenatal vitamin with DHA 27-0.8-228 MG Oral Cap Take 1 capsule by mouth in the morning.   5/2/2025 Morning    Cholecalciferol (VITAMIN D) 50 MCG (2000 UT) Oral Tab Take 50 mcg by mouth daily. 30 tablet 3 5/2/2025 Morning    Ferrous Sulfate 325 (65 Fe) MG Oral Tab Take 1 tablet (325 mg total) by mouth every other day. 90 tablet 2 More than a month

## 2025-05-04 NOTE — ANESTHESIA PROCEDURE NOTES
Labor Analgesia    Date/Time: 5/4/2025 5:30 PM    Performed by: Delmar Mcintyre DO  Authorized by: Delmar Mcintyre DO      General Information and Staff    Start Time:  5/4/2025 5:30 PM  End Time:  5/4/2025 5:35 PM  Anesthesiologist:  Delmar Mcintyre DO  Performed by:  Anesthesiologist  Patient Location:  OB  Reason for Block: labor epidural    Preanesthetic Checklist: patient identified, IV checked, site marked, risks and benefits discussed, monitors and equipment checked, pre-op evaluation, timeout performed, anesthesia consent and sterile technique used      Procedure Details    Patient Position:  Sitting  Prep: ChloraPrep    Monitoring:  Heart rate  Approach:  Midline    Epidural Needle    Injection Technique:  RAFFI saline  Needle Type:  Tuohy  Needle Gauge:  18 G  Needle Length:  3.5 in  Needle Insertion Depth:  6  Location:  L3-4    Spinal Needle      Catheter    Catheter Type:  Multi-orifice  Catheter Size:  20 G  Catheter at Skin Depth:  12  Test Dose:  Negative    Assessment      Additional Comments

## 2025-05-04 NOTE — PLAN OF CARE
Problem: BIRTH - VAGINAL/ SECTION  Goal: Fetal and maternal status remain reassuring during the birth process  Description: INTERVENTIONS:- Monitor vital signs- Monitor fetal heart rate- Monitor uterine activity- Monitor labor progression (vaginal delivery)- DVT prophylaxis (C/S delivery)- Surgical antibiotic prophylaxis (C/S delivery)  Outcome: Progressing     Problem: PAIN - ADULT  Goal: Verbalizes/displays adequate comfort level or patient's stated pain goal  Description: INTERVENTIONS:- Encourage pt to monitor pain and request assistance- Assess pain using appropriate pain scale- Administer analgesics based on type and severity of pain and evaluate response- Implement non-pharmacological measures as appropriate and evaluate response- Consider cultural and social influences on pain and pain management- Manage/alleviate anxiety- Utilize distraction and/or relaxation techniques- Monitor for opioid side effects- Notify MD/LIP if interventions unsuccessful or patient reports new pain- Anticipate increased pain with activity and pre-medicate as appropriate  Outcome: Progressing     Problem: ANXIETY  Goal: Will report anxiety at manageable levels  Description: INTERVENTIONS:- Administer medication as ordered- Teach and rehearse alternative coping skills- Provide emotional support with 1:1 interaction with staff  Outcome: Progressing     Problem: Patient Centered Care  Goal: Patient preferences are identified and integrated in the patient's plan of care  Description: Interventions:- What would you like us to know as we care for you? Have a safe delivery. We are having a baby boy!  - Provide timely, complete, and accurate information to patient/family  - Incorporate patient and family knowledge, values, beliefs, and cultural backgrounds into the planning and delivery of care  - Encourage patient/family to participate in care and decision-making at the level they choose  - Honor patient and family perspectives and  choices  Outcome: Progressing     Problem: Patient/Family Goals  Goal: Patient/Family Long Term Goal  Description: Patient's Long Term Goal: Uncomplicated Delivery     Interventions:  - Assessment/Monitoring  - Induction/Augmentation per protocol and Provider order  - C/S per protocol and Provider order   - Education  - Intervention per protocol and Provider order with education   - Involve patient in POC  - See additional Care Plan goals for specific interventions    Outcome: Progressing  Goal: Patient/Family Short Term Goal  Description: Patient's Short Term Goal: Comfort and Pain Control     Interventions:   - Non Pharmacological pain intervention   - IV/IM and Epidural pain medication per Provider order and patient request  - Education  - Involve Patient in POC   - See additional Care Plan goals for specific interventions      Outcome: Progressing

## 2025-05-04 NOTE — PLAN OF CARE
Problem: BIRTH - VAGINAL/ SECTION  Goal: Fetal and maternal status remain reassuring during the birth process  Description: INTERVENTIONS:- Monitor vital signs- Monitor fetal heart rate- Monitor uterine activity- Monitor labor progression (vaginal delivery)- DVT prophylaxis (C/S delivery)- Surgical antibiotic prophylaxis (C/S delivery)  Outcome: Progressing     Problem: PAIN - ADULT  Goal: Verbalizes/displays adequate comfort level or patient's stated pain goal  Description: INTERVENTIONS:- Encourage pt to monitor pain and request assistance- Assess pain using appropriate pain scale- Administer analgesics based on type and severity of pain and evaluate response- Implement non-pharmacological measures as appropriate and evaluate response- Consider cultural and social influences on pain and pain management- Manage/alleviate anxiety- Utilize distraction and/or relaxation techniques- Monitor for opioid side effects- Notify MD/LIP if interventions unsuccessful or patient reports new pain- Anticipate increased pain with activity and pre-medicate as appropriate  Outcome: Progressing     Problem: ANXIETY  Goal: Will report anxiety at manageable levels  Description: INTERVENTIONS:- Administer medication as ordered- Teach and rehearse alternative coping skills- Provide emotional support with 1:1 interaction with staff  Outcome: Progressing     Problem: Patient Centered Care  Goal: Patient preferences are identified and integrated in the patient's plan of care  Description: Interventions:- What would you like us to know as we care for you? Have a safe delivery- Provide timely, complete, and accurate information to patient/family- Incorporate patient and family knowledge, values, beliefs, and cultural backgrounds into the planning and delivery of care- Encourage patient/family to participate in care and decision-making at the level they choose- Honor patient and family perspectives and choices  Outcome: Progressing      Problem: Patient/Family Goals  Goal: Patient/Family Long Term Goal  Description: Patient's Long Term Goal: Uncomplicated Delivery     Interventions:  - Assessment/Monitoring  - Induction/Augmentation per protocol and Provider order  - C/S per protocol and Provider order   - Education  - Intervention per protocol and Provider order with education   - Involve patient in POC  - See additional Care Plan goals for specific interventions    Outcome: Progressing  Goal: Patient/Family Short Term Goal  Description: Patient's Short Term Goal: Comfort and Pain Control     Interventions:   - Non Pharmacological pain intervention   - IV/IM and Epidural pain medication per Provider order and patient request  - Education  - Involve Patient in POC   - See additional Care Plan goals for specific interventions      Outcome: Progressing

## 2025-05-04 NOTE — ANESTHESIA PREPROCEDURE EVALUATION
Anesthesia PreOp Note    HPI:     Catalina Hodges is a 29 year old female who presents for preoperative consultation requested by: * No surgeons listed *    Date of Surgery: 5/4/2025    * No procedures listed *  Indication: * No pre-op diagnosis entered *    Relevant Problems   No relevant active problems       NPO:                         History Review:  Patient Active Problem List    Diagnosis Date Noted    Pregnancy ([facility]) 05/03/2025    Encounter for supervision of normal first pregnancy in third trimester ([facility]) 03/04/2025    History of maternal chlamydia infection, currently pregnant ([facility]) 12/17/2024    Vaginal candidiasis 10/03/2022    Bacteria in urine 10/03/2022    Vitamin D deficiency 10/03/2022       Past Medical History[1]    Past Surgical History[2]    Prescriptions Prior to Admission[3]  Current Medications and Prescriptions Ordered in Epic[4]    Allergies[5]    Family History[6]  Social Hx on file[7]    Available pre-op labs reviewed.  Lab Results   Component Value Date    WBC 5.6 05/03/2025    WBC 6.0 02/10/2025    RBC 4.89 05/03/2025    RBC 4.41 02/10/2025    HGB 12.8 05/03/2025    HGB 11.7 02/10/2025    HCT 38.5 05/03/2025    HCT 37.3 02/10/2025    MCV 78.7 (L) 05/03/2025    MCV 84.6 02/10/2025    MCH 26.2 05/03/2025    MCH 26.5 (L) 02/10/2025    MCHC 33.2 05/03/2025    MCHC 31.4 (L) 02/10/2025    RDW 13.8 05/03/2025    RDW 13.0 02/10/2025    .0 05/03/2025     02/10/2025             Vital Signs:  Body mass index is 31.45 kg/m².   height is 1.753 m (5' 9\") and weight is 96.6 kg (213 lb). Her oral temperature is 98.3 °F (36.8 °C). Her blood pressure is 115/70 and her pulse is 75. Her respiration is 16.   Vitals:    05/04/25 0400 05/04/25 0836 05/04/25 1330 05/04/25 1630   BP: 117/74 117/67 115/70    Pulse: 79 80 75    Resp: 18 16 16    Temp: 98 °F (36.7 °C) 98.1 °F (36.7 °C) 98.1 °F (36.7 °C) 98.3 °F (36.8 °C)   TempSrc: Oral Oral Oral Oral   Weight:       Height:            Anesthesia  Evaluation      Airway   Mallampati: III  TM distance: >3 FB  Neck ROM: full  Dental      Pulmonary - normal exam   (-) asthma  Cardiovascular - normal exam  (-) hypertension    Neuro/Psych      GI/Hepatic/Renal    (-) GERD    Endo/Other    (-) diabetes mellitus  Abdominal   (-) obese                 Anesthesia Plan:   ASA:  2  Plan:   Epidural  Plan Comments: 30 yo F requesting a labor epidural. She denies history of coagulopathy, not on blood thinners, no previous back surgery, spina bifida or scoliosis. Platelets are 214.     I have informed patient of the risks of neuraxial anesthesia including, but not limited to: failure, headache, backache, hematoma, unilateral/patchy block, difficulty breathing, infection, bleeding, nerve damage, paralysis, death. The patient desires the proposed neuraxial anesthetic as planned.     All anesthetic questions answered.        I have informed Catalina ABRAHAM Sextonz and/or legal guardian or family member of the nature of the anesthetic plan, benefits, risks including possible dental damage if relevant, major complications, and any alternative forms of anesthetic management.   All of the patient's questions were answered to the best of my ability. The patient desires the anesthetic management as planned.  Delmar Mcintyre DO  5/4/2025 5:04 PM  Present on Admission:  **None**           [1] History reviewed. No pertinent past medical history.  [2] History reviewed. No pertinent surgical history.  [3]   Medications Prior to Admission   Medication Sig Dispense Refill Last Dose/Taking    prenatal vitamin with DHA 27-0.8-228 MG Oral Cap Take 1 capsule by mouth in the morning.   5/2/2025 Morning    Cholecalciferol (VITAMIN D) 50 MCG (2000 UT) Oral Tab Take 50 mcg by mouth daily. 30 tablet 3 5/2/2025 Morning    Ferrous Sulfate 325 (65 Fe) MG Oral Tab Take 1 tablet (325 mg total) by mouth every other day. 90 tablet 2 More than a month   [4]   Current Facility-Administered Medications Ordered in Epic    Medication Dose Route Frequency Provider Last Rate Last Admin    fentaNYL-bupivacaine 2 mcg/mL-0.125% in sodium chloride 0.9% 100 mL EPIDURAL infusion premix   Epidural Continuous Mcintyre, Delmar, DO        fentaNYL (Sublimaze) 50 mcg/mL injection 100 mcg  100 mcg Epidural Once Mcintyre, Delmar, DO        bupivacaine PF (Marcaine) 0.25% injection  20 mL Epidural Once Mcintyre, Delmar, DO        ePHEDrine (PF) 25 MG/5 ML injection 5 mg  5 mg Intravenous PRN Mcintyre, Delmar, DO        nalbuphine (Nubain) 10 mg/mL injection 2.5 mg  2.5 mg Intravenous Q15 Min PRN Mcintyre, Delmar, DO        oxyTOCIN in sodium chloride 0.9% (Pitocin) 30 Units/500mL infusion premix  0.5-20 yovani-units/min Intravenous Continuous Rehan Buckley MD   Held at 05/04/25 1445    lactated ringers IV bolus 500 mL  500 mL Intravenous PRN Rehan Buckley MD        acetaminophen (Tylenol Extra Strength) tab 500 mg  500 mg Oral Q6H PRN Rehan Buckley MD        acetaminophen (Tylenol Extra Strength) tab 1,000 mg  1,000 mg Oral Q6H PRN Rehan Buckley MD        ibuprofen (Motrin) tab 600 mg  600 mg Oral Once PRN Rehan Buckley MD        ondansetron (Zofran) 4 MG/2ML injection 4 mg  4 mg Intravenous Q6H PRN Rehan Buckley MD        oxyTOCIN in sodium chloride 0.9% (Pitocin) 30 Units/500mL infusion premix  62.5-900 yovani-units/min Intravenous Continuous Rehan Buckley MD        terbutaline (Brethine) 1 MG/ML injection 0.25 mg  0.25 mg Subcutaneous PRN Rehan Buckley MD        sodium citrate-citric acid (Bicitra) 500-334 MG/5ML oral solution 30 mL  30 mL Oral PRN Rehan Buckley MD        lidocaine PF (Xylocaine-MPF) 1% injection  30 mL Intradermal Once Rehan Buckley MD        nalbuphine (Nubain) 10 mg/mL injection 10 mg  10 mg Intramuscular Q6H PRN Rehan Buckley MD   10 mg at 05/04/25 0214    And    hydrOXYzine 50 mg/mL injection 50 mg  50 mg Intramuscular Q6H PRN Rehan Buckley MD   50 mg at 05/04/25 0214     fentaNYL (Sublimaze) 50 mcg/mL injection 100 mcg  100 mcg Intravenous Once Rehan Buckley MD        fentaNYL (Sublimaze) 50 mcg/mL injection 50 mcg  50 mcg Intravenous Q30 Min PRN Rehan Buckley MD        dextrose in lactated ringers 5% infusion   Intravenous Continuous PRN Rehan Buckley MD        lactated ringers infusion   Intravenous Continuous Rehan Buckley MD   Paused at 05/04/25 1339     No current [facility]-ordered outpatient medications on file.   [5] No Known Allergies  [6]   Family History  Problem Relation Age of Onset    Cancer Mother 40        breast cancer    No Known Problems Maternal Grandmother     No Known Problems Maternal Grandfather     Diabetes Paternal Grandmother     High Blood Pressure Paternal Grandfather    [7]   Social History  Socioeconomic History    Marital status: Single   Tobacco Use    Smoking status: Never    Smokeless tobacco: Never   Vaping Use    Vaping status: Never Used   Substance and Sexual Activity    Alcohol use: Not Currently    Drug use: Never

## 2025-05-05 PROCEDURE — 0KQM0ZZ REPAIR PERINEUM MUSCLE, OPEN APPROACH: ICD-10-PCS | Performed by: OBSTETRICS & GYNECOLOGY

## 2025-05-05 PROCEDURE — 59400 OBSTETRICAL CARE: CPT | Performed by: OBSTETRICS & GYNECOLOGY

## 2025-05-05 RX ORDER — ACETAMINOPHEN 500 MG
500 TABLET ORAL EVERY 6 HOURS PRN
Status: DISCONTINUED | OUTPATIENT
Start: 2025-05-05 | End: 2025-05-07

## 2025-05-05 RX ORDER — DOCUSATE SODIUM 100 MG/1
100 CAPSULE, LIQUID FILLED ORAL
Status: DISCONTINUED | OUTPATIENT
Start: 2025-05-05 | End: 2025-05-07

## 2025-05-05 RX ORDER — SIMETHICONE 80 MG
80 TABLET,CHEWABLE ORAL 3 TIMES DAILY PRN
Status: DISCONTINUED | OUTPATIENT
Start: 2025-05-05 | End: 2025-05-07

## 2025-05-05 RX ORDER — AMMONIA 15 % (W/V)
0.3 AMPUL (EA) INHALATION AS NEEDED
Status: DISCONTINUED | OUTPATIENT
Start: 2025-05-05 | End: 2025-05-07

## 2025-05-05 RX ORDER — BISACODYL 10 MG
10 SUPPOSITORY, RECTAL RECTAL ONCE AS NEEDED
Status: DISCONTINUED | OUTPATIENT
Start: 2025-05-05 | End: 2025-05-07

## 2025-05-05 RX ORDER — ACETAMINOPHEN 500 MG
1000 TABLET ORAL EVERY 6 HOURS PRN
Status: DISCONTINUED | OUTPATIENT
Start: 2025-05-05 | End: 2025-05-07

## 2025-05-05 RX ORDER — IBUPROFEN 600 MG/1
600 TABLET, FILM COATED ORAL EVERY 6 HOURS
Status: DISCONTINUED | OUTPATIENT
Start: 2025-05-05 | End: 2025-05-07

## 2025-05-05 NOTE — LACTATION NOTE
This note was copied from a baby's chart.     05/05/25 1600   Evaluation Type   Evaluation Type Inpatient   Problems & Assessment   Problems Diagnosed or Identified Sleepy   Problems: comment/detail Dr. Lai said infant has a tongue tie, seen at 14 hours old   Infant Assessment Minimal hunger cues present   Muscle tone Appropriate for GA   Feeding Assessment   Summary Current Feeding Breastfeeding exclusively   Breastfeeding Assessment Assisted with breastfeeding w/mother's permission;No sustained latch to breast   Breastfeeding lasted # of minutes 0   Breastfeeding Positions cross cradle;right breast   Latch 0   Audible Sucks/Swallows 0   Type of Nipple 1   Comfort (Breast/Nipple) 2   Hold (Positioning) 0   LATCH Score 3   Other (comment) Dr. Lai said that the infant has a tongue tie, discussed skin to skin, hand massage and expression, assisted with a latch in cross cradle position to the right side, infant not showing cues to feed, then tried with a nipple shield and still no cues, then shown mom how to use the handpump that had been given, then shown how to use the Ameda pump, mom said that she would like to give breast milk, encouragement given and asked to call if needed.   Equipment used   Equipment used Nipple Shield   Nipple shield size 20 mm  (no latch with the nipple shield)

## 2025-05-05 NOTE — PROGRESS NOTES
PROGRESS NOTE        Date:     2025    Patient: Catalina Hodges       :    1995  Age:     29 year old      Gender: female  MRN:  [id number]    Subjective :  The patient is 29 year old y/o  Postpartum day #1 from a vaginal delivery.  She is doing well.  Lochia normal.  Pain controlled.  Breastfeeding without difficulty.       Objective   Physical Exam:  /66 (BP Location: Right arm)   Pulse 63   Temp 97.7 °F (36.5 °C) (Oral)   Resp 14   Ht 5' 9\" (1.753 m)   Wt 213 lb   LMP 2024 (Exact Date)   SpO2 99%   Breastfeeding Yes   BMI 31.45 kg/m²     Intake/Output Summary (Last 24 hours) at 2025 0700  Last data filed at 2025 0635  Gross per 24 hour   Intake 3848.85 ml   Output 947 ml   Net 2901.85 ml     Abdomen - soft, ND, NT  Fundus -firm, NT  Lower Extremities - NT    Result Data:  Lab Results   Component Value Date    WBC 5.6 2025    RBC 4.89 2025    HGB 12.8 2025    HCT 38.5 2025    .0 2025    GLU 87 2024    BUN 7 2024     2024    K 4.3 2024     2024    CA 9.6 2024    CO2 25 2024     Abnormal Labs Reviewed   CBC WITH DIFFERENTIAL WITH PLATELET - Abnormal; Notable for the following components:       Result Value    MCV 78.7 (*)     All other components within normal limits             Assessment and Plan:    Active Problems:    Pregnancy (HCC)        PPD # 1  Cont PP care.  Ambulate.  Possible discharge on Wednesday      Peyton DACOSTA

## 2025-05-05 NOTE — LACTATION NOTE
05/05/25 1600   Evaluation Type   Evaluation Type Inpatient   Problems identified   Problems identified Knowledge deficit;Unable to acheive sustained latch   Problems Identified Other Dr. Lai said there was a tongue tie on infant   Maternal history   Maternal history Induction of labor   Breastfeeding goal   Breastfeeding goal To maintain breast milk feeding per patient goal   Maternal Assessment   Bilateral Breasts Pendulous;Soft;Symmetrical   Bilateral Nipples Flat;Colostrum easily expressed   Prior breastfeeding experience (comment below) Primip   Breastfeeding Assistance Breastfeeding assistance provided with permission;Pumping assistance provided with permission;Breast exam provided with permission;Hand expression provided with permission   Pain assessment   Location/Comment denies   Guidelines for use of:   Breast pump type Ameda Platinum;Hand Pump;Other   Current use of pump: set up with pump   Suggested use of pump Pump each time a supplement is offered;Pump if infant is not latching to breast;Pump after nursing if a nipple shield is used   Other (comment) Dr. Lai said that the infant has a tongue tie, discussed skin to skin, hand massage and expression, assisted with a latch in cross cradle position to the right side, infant not showing cues to feed, then tried with a nipple shield and still no cues, then shown mom how to use the handpump that had been given, then shown how to use the Ameda pump, mom said that she would like to give breast milk, encouragement given and asked to call if needed.

## 2025-05-05 NOTE — PROGRESS NOTES
Pt pushing 2.5 hours, pt exhausted, pt asking for assistance with vacuum, pt counseled on risks/benefits/alternatives including risks of hematoma/hemorrhage,among others.  Neonatology at delivery.

## 2025-05-05 NOTE — ANESTHESIA POSTPROCEDURE EVALUATION
Patient: Catalina Hodges    Procedure Summary       Date: 05/04/25 Room / Location:     Anesthesia Start: 1735 Anesthesia Stop: 05/05/25 0212    Procedure: LABOR ANALGESIA Diagnosis:     Scheduled Providers:  Anesthesiologist: Delmar Mcintyre DO    Anesthesia Type: epidural ASA Status: 2            Anesthesia Type: No value filed.    Vitals Value Taken Time   /58 05/05/25 03:00   Temp 97.7 05/05/25 04:03   Pulse 79 05/05/25 03:00   Resp 14 05/05/25 04:03   SpO2 99 % 05/05/25 02:30       [facility] AN Post Evaluation:   Patient Evaluated in floor  Patient Participation: complete - patient participated  Level of Consciousness: awake and alert  Pain Management: adequate  Airway Patency:patent  Yes    Cardiovascular Status: acceptable  Respiratory Status: acceptable  Postoperative Hydration acceptable      Delmar Mcintyre DO  5/5/2025 4:03 AM

## 2025-05-05 NOTE — L&D DELIVERY NOTE
[facility]  part of [facility]    Vaginal Delivery Note    Catalina Hodges Patient Status:  Inpatient    1995 MRN [id number]   Location [facility] Attending Rehan Buckley MD   Hosp Day # 2 PCP SARY RAWLS MD     Delivery     Diagnosis: postdates/induction of labor    Labor Details: Induction    Procedure: operative vaginal delivery    Neonatologist Present: yes    Placenta  Date/Time of Delivery: 2025  2:12 AM   Delivery: spontaneous  Placenta to Pathology: yes  Cord Gases Submitted: yes    Cord Complications: none    Maternal Anesthesia: epidural   Episiotomy/Laceration Repair  Laceration: perineal 2nd degree    Sponge and Needle Counts:  Verified yes    Delivery Complications  Vacuum Assistance: indication for: maternal exhaustion and non-reassuring fetal status.  Verbal consent obtained. Station of fetal vertex is outlet (head at perineum). Bladder emptied: yes.  Type of vacuum used was a Kiwi 6000 (soft cup). Maximum Pressure in the green zone. Number of pulls 2. Number of pop offs none. Minutes applied to fetal scalp 1. Pressure applied only with contration yes.  scalp injuries: none.  Additional comments: infant pink crying and active at delivery.    Hemorrhage?: No, patient is stable, asymptomatic and blood loss is within expected amount following delivery. Standard treatment provided to prevent PPH. Patient does not meet ACOG criteria for hemorrhage at this time.    QBL: Quantitative Blood Loss (mL)         Delivery Comments:  neonatology dr gonzalez at delivery.  Infant pink crying and active.  Apg 8, 9    Rehan Buckley MD   2025  2:32 AM

## 2025-05-05 NOTE — PROGRESS NOTES
Pt comfortable with epidural    Sve complete/plus 2,  sve  with fht acceleration 10 bpm scalp stim    Pt pushing.  Pt counseled.

## 2025-05-05 NOTE — PROGRESS NOTES
Patient up to bathroom with assist x 2.  Unable to void at this time. Patient transferred to mother/baby room 349 per wheelchair in stable condition with baby and personal belongings.  Accompanied by significant other and staff.  Report given to mother/baby PAULA Kwon.

## 2025-05-05 NOTE — PLAN OF CARE
Problem: POSTPARTUM  Goal: Long Term Goal:Experiences normal postpartum course  Description: INTERVENTIONS:- Assess and monitor vital signs and lab values.- Assess fundus and lochia.- Provide ice/sitz baths for perineum discomfort.- Monitor healing of incision/episiotomy/laceration, and assess for signs and symptoms of infection and hematoma.- Assess bladder function and monitor for bladder distention.- Provide/instruct/assist with pericare as needed.- Provide VTE prophylaxis as needed.- Monitor bowel function.- Encourage ambulation and provide assistance as needed.- Assess and monitor emotional status and provide social service/psych resources as needed.- Utilize standard precautions and use personal protective equipment as indicated. Ensure aseptic care of all intravenous lines and invasive tubes/drains.- Obtain immunization and exposure to communicable diseases history.  Outcome: Progressing  Goal: Optimize infant feeding at the breast  Description: INTERVENTIONS:- Initiate breast feeding within first hour after birth. - Monitor effectiveness of current breast feeding efforts.- Assess support systems available to mother/family.- Identify cultural beliefs/practices regarding lactation, letdown techniques, maternal food preferences.- Assess mother's knowledge and previous experience with breast feeding.- Provide information as needed about early infant feeding cues (e.g., rooting, lip smacking, sucking fingers/hand) versus late cue of crying.- Discuss/demonstrate breast feeding aids (e.g., infant sling, nursing footstool/pillows, and breast pumps).- Encourage mother/other family members to express feelings/concerns, and actively listen.- Educate father/SO about benefits of breast feeding and how to manage common lactation challenges.- Recommend avoidance of specific medications or substances incompatible with breast feeding.- Assess and monitor for signs of nipple pain/trauma.- Instruct and provide assistance with  proper latch.- Review techniques for milk expression (breast pumping) and storage of breast milk. Provide pumping equipment/supplies, instructions and assistance, as needed.- Encourage rooming-in and breast feeding on demand.- Encourage skin-to-skin contact.- Provide LC support as needed.- Assess for and manage engorgement.- Provide breast feeding education handouts and information on community breast feeding support.   Outcome: Progressing  Goal: Establishment of adequate milk supply with medication/procedure interruptions  Description: INTERVENTIONS:- Review techniques for milk expression (breast pumping). - Provide pumping equipment/supplies, instructions, and assistance until it is safe to breastfeed infant.  Outcome: Progressing  Goal: Appropriate maternal -  bonding  Description: INTERVENTIONS:- Assess caregiver- interactions.- Assess caregiver's emotional status and coping mechanisms.- Encourage caregiver to participate in  daily care.- Assess support systems available to mother/family.- Provide /case management support as needed.  Outcome: Progressing

## 2025-05-05 NOTE — PROGRESS NOTES
Received pt in . Pt transferred via WC. VS and assessment WNL. Oriented to room. POC reviewed. Instructed to call for assistance when ready to void. Report received from L&D RN, Deasha.

## 2025-05-06 ENCOUNTER — TELEPHONE (OUTPATIENT)
Dept: OBGYN CLINIC | Facility: CLINIC | Age: 30
End: 2025-05-06

## 2025-05-06 LAB
BASOPHILS # BLD AUTO: 0.02 X10(3) UL (ref 0–0.2)
BASOPHILS NFR BLD AUTO: 0.2 %
DEPRECATED RDW RBC AUTO: 42.5 FL (ref 35.1–46.3)
EOSINOPHIL # BLD AUTO: 0.14 X10(3) UL (ref 0–0.7)
EOSINOPHIL NFR BLD AUTO: 1.5 %
ERYTHROCYTE [DISTWIDTH] IN BLOOD BY AUTOMATED COUNT: 14 % (ref 11–15)
HCT VFR BLD AUTO: 32.9 % (ref 35–48)
HGB BLD-MCNC: 10.6 G/DL (ref 12–16)
IMM GRANULOCYTES # BLD AUTO: 0.02 X10(3) UL (ref 0–1)
IMM GRANULOCYTES NFR BLD: 0.2 %
LYMPHOCYTES # BLD AUTO: 2.46 X10(3) UL (ref 1–4)
LYMPHOCYTES NFR BLD AUTO: 27.1 %
MCH RBC QN AUTO: 26.8 PG (ref 26–34)
MCHC RBC AUTO-ENTMCNC: 32.2 G/DL (ref 31–37)
MCV RBC AUTO: 83.3 FL (ref 80–100)
MONOCYTES # BLD AUTO: 0.79 X10(3) UL (ref 0.1–1)
MONOCYTES NFR BLD AUTO: 8.7 %
NEUTROPHILS # BLD AUTO: 5.64 X10 (3) UL (ref 1.5–7.7)
NEUTROPHILS # BLD AUTO: 5.64 X10(3) UL (ref 1.5–7.7)
NEUTROPHILS NFR BLD AUTO: 62.3 %
PLATELET # BLD AUTO: 176 10(3)UL (ref 150–450)
RBC # BLD AUTO: 3.95 X10(6)UL (ref 3.8–5.3)
WBC # BLD AUTO: 9.1 X10(3) UL (ref 4–11)

## 2025-05-06 NOTE — PROGRESS NOTES
[facility]  part of [facility]    OB/GYNE Progress Note      Catalina Hodges Patient Status:  Inpatient    1995 MRN [id number]   Location [facility] 3SE Attending Rehan Buckley MD   Hosp Day # 3 PCP SARY RAWLS MD          Subjective     No c/o    Review of Systems:  Constitutional: feeling well        Objective   Vital signs in last 24 hours:  Temp:  [97.9 °F (36.6 °C)-98.9 °F (37.2 °C)] 98.1 °F (36.7 °C)  Pulse:  [74-89] 74  Resp:  [16] 16  BP: (107-118)/(67-77) 111/77    Input/Output:  No intake or output data in the 24 hours ending 25 1246    Weight (Last 6):  Wt Readings from Last 6 Encounters:   25 213 lb (96.6 kg)   25 213 lb (96.6 kg)   25 217 lb (98.4 kg)   04/15/25 215 lb 8 oz (97.8 kg)   04/10/25 210 lb (95.3 kg)   25 211 lb (95.7 kg)     Body mass index is 31.45 kg/m².     Exam:   Constitutional: comfortable  Uterus: fundus firm and fundus below umbilicus  Extremities: No edema  Negative Lanre's sign.          Results:     Lab Results   Component Value Date    TREPONEMALAB Nonreactive 2025    ABO O 2025    RH Positive 2025    WBC 9.1 2025    HGB 10.6 (L) 2025    HCT 32.9 (L) 2025    .0 2025    CREATSERUM 0.67 2024    BUN 7 2024     2024    K 4.3 2024     2024    CO2 25 2024    GLU 87 2024    CA 9.6 2024    ALB 4.3 2024    ALKPHO 98 2024    BILT 0.5 2024    TP 7.9 2024    AST 14 2024    ALT 9 2024    TSH 1.500 2019       Lab Results   Component Value Date    COLORUR YELLOW 2024    CLARITY CLEAR 2024    SPECGRAVITY 1.011 2024    PROUR NEGATIVE 2024    GLUUR NEGATIVE 2024    KETUR TRACE (A) 2024    BILUR NEGATIVE 2024    NITRITE NEGATIVE 2024    LEUUR NEGATIVE 2024       No results found.      Assessment/Plan     Pregnancy  (HCC)  pp1        cpm      Discussed with: patient     Plan discussed with patient who verbalizes understanding and agreement.          Preston Vasquez MD  5/6/2025  12:46 PM

## 2025-05-06 NOTE — PLAN OF CARE
Problem: PAIN - ADULT  Goal: Verbalizes/displays adequate comfort level or patient's stated pain goal  Description: INTERVENTIONS:- Encourage pt to monitor pain and request assistance- Assess pain using appropriate pain scale- Administer analgesics based on type and severity of pain and evaluate response- Implement non-pharmacological measures as appropriate and evaluate response- Consider cultural and social influences on pain and pain management- Manage/alleviate anxiety- Utilize distraction and/or relaxation techniques- Monitor for opioid side effects- Notify MD/LIP if interventions unsuccessful or patient reports new pain- Anticipate increased pain with activity and pre-medicate as appropriate  Outcome: Progressing     Problem: ANXIETY  Goal: Will report anxiety at manageable levels  Description: INTERVENTIONS:- Administer medication as ordered- Teach and rehearse alternative coping skills- Provide emotional support with 1:1 interaction with staff  Outcome: Progressing     Problem: Patient Centered Care  Goal: Patient preferences are identified and integrated in the patient's plan of care  Description: Interventions:- What would you like us to know as we care for you? Have a safe delivery. We are having a baby boy!  - Provide timely, complete, and accurate information to patient/family  - Incorporate patient and family knowledge, values, beliefs, and cultural backgrounds into the planning and delivery of care  - Encourage patient/family to participate in care and decision-making at the level they choose  - Honor patient and family perspectives and choices  5/6/2025 0448 by Batool Uribe, RN  Outcome: Progressing  5/6/2025 0448 by Batool Uribe, RN  Outcome: Progressing     Problem: Patient/Family Goals  Goal: Patient/Family Long Term Goal  Description: Patient's Long Term Goal: Uncomplicated Delivery     Interventions:  - Assessment/Monitoring  - Induction/Augmentation per protocol and Provider order  - C/S  per protocol and Provider order   - Education  - Intervention per protocol and Provider order with education   - Involve patient in POC  - See additional Care Plan goals for specific interventions    5/6/2025 0448 by Batool Uribe RN  Outcome: Progressing  5/6/2025 0448 by Batool Uribe RN  Outcome: Progressing  Goal: Patient/Family Short Term Goal  Description: Patient's Short Term Goal: Comfort and Pain Control     Interventions:   - Non Pharmacological pain intervention   - IV/IM and Epidural pain medication per Provider order and patient request  - Education  - Involve Patient in POC   - See additional Care Plan goals for specific interventions      5/6/2025 0448 by Batool Uribe RN  Outcome: Progressing  5/6/2025 0448 by Batool Uribe RN  Outcome: Progressing     Problem: POSTPARTUM  Goal: Long Term Goal:Experiences normal postpartum course  Description: INTERVENTIONS:- Assess and monitor vital signs and lab values.- Assess fundus and lochia.- Provide ice/sitz baths for perineum discomfort.- Monitor healing of incision/episiotomy/laceration, and assess for signs and symptoms of infection and hematoma.- Assess bladder function and monitor for bladder distention.- Provide/instruct/assist with pericare as needed.- Provide VTE prophylaxis as needed.- Monitor bowel function.- Encourage ambulation and provide assistance as needed.- Assess and monitor emotional status and provide social service/psych resources as needed.- Utilize standard precautions and use personal protective equipment as indicated. Ensure aseptic care of all intravenous lines and invasive tubes/drains.- Obtain immunization and exposure to communicable diseases history.  5/6/2025 0448 by Batool Uribe RN  Outcome: Progressing  5/6/2025 0448 by Batool Uribe RN  Outcome: Progressing  Goal: Optimize infant feeding at the breast  Description: INTERVENTIONS:- Initiate breast feeding within first hour after birth. - Monitor effectiveness of  current breast feeding efforts.- Assess support systems available to mother/family.- Identify cultural beliefs/practices regarding lactation, letdown techniques, maternal food preferences.- Assess mother's knowledge and previous experience with breast feeding.- Provide information as needed about early infant feeding cues (e.g., rooting, lip smacking, sucking fingers/hand) versus late cue of crying.- Discuss/demonstrate breast feeding aids (e.g., infant sling, nursing footstool/pillows, and breast pumps).- Encourage mother/other family members to express feelings/concerns, and actively listen.- Educate father/SO about benefits of breast feeding and how to manage common lactation challenges.- Recommend avoidance of specific medications or substances incompatible with breast feeding.- Assess and monitor for signs of nipple pain/trauma.- Instruct and provide assistance with proper latch.- Review techniques for milk expression (breast pumping) and storage of breast milk. Provide pumping equipment/supplies, instructions and assistance, as needed.- Encourage rooming-in and breast feeding on demand.- Encourage skin-to-skin contact.- Provide LC support as needed.- Assess for and manage engorgement.- Provide breast feeding education handouts and information on community breast feeding support.   5/6/2025 0448 by Batool rUibe RN  Outcome: Progressing  5/6/2025 0448 by Batool Uribe, RN  Outcome: Progressing  Goal: Establishment of adequate milk supply with medication/procedure interruptions  Description: INTERVENTIONS:- Review techniques for milk expression (breast pumping). - Provide pumping equipment/supplies, instructions, and assistance until it is safe to breastfeed infant.  5/6/2025 0448 by Batool Uribe, RN  Outcome: Progressing  5/6/2025 0448 by Batool Uribe, RN  Outcome: Progressing  Goal: Experiences normal breast weaning course  Description: INTERVENTIONS:- Assess for and manage engorgement.- Instruct on  breast care.- Provide comfort measures.  Outcome: Progressing  Goal: Appropriate maternal -  bonding  Description: INTERVENTIONS:- Assess caregiver- interactions.- Assess caregiver's emotional status and coping mechanisms.- Encourage caregiver to participate in  daily care.- Assess support systems available to mother/family.- Provide /case management support as needed.  2025 by Batool Uribe, RN  Outcome: Progressing  2025 by Batool Uribe, RN  Outcome: Progressing

## 2025-05-06 NOTE — PAYOR COMM NOTE
--------------  ADMISSION REVIEW     Payor: [employer] CHOICE/HMO/POS/EPO  Subscriber #:  [id number]  Authorization Number: [id number]    Admit date: 5/3/25  Admit time:  8:10 AM             History and Physical    H&P signed by Rehan Buckley MD at 2025  9:30 AM    [facility]  part of [facility]     History & Physical        Date of Admission:  5/3/2025        HPI:   Catalina Hodges is a 29 year old  female, current EGA of 40w3d with an estimated date of delivery of: Estimated Date of Delivery: 25        Catalina Hodges is being admitted for induction of labor.    Her current obstetrical history is significant for  postdates.  .    Patient reports good fetal movement .     Fetal Movement: normal.      History   Obstetric History:                     OB History    Para Term  AB Living    1              SAB IAB Ectopic Multiple Live Births                           # Outcome Date GA Lbr Pravin/2nd Weight Sex Type Anes PTL Lv   1 Current                        Past Medical History: [Past Medical History]    [Past Medical History]  History reviewed. No pertinent past medical history.  Past Social History: [Past Surgical History]    [Past Surgical History]  History reviewed. No pertinent surgical history.  Family History: [Family History]    [Family History]        Problem Relation Age of Onset    Cancer Mother 40         breast cancer    No Known Problems Maternal Grandmother      No Known Problems Maternal Grandfather      Diabetes Paternal Grandmother      High Blood Pressure Paternal Grandfather       Social History:   Social History           Tobacco Use    Smoking status: Never    Smokeless tobacco: Never   Substance Use Topics    Alcohol use: Not Currently         Allergies/Medications:   Allergies:   [Allergies]    [Allergies]  No Known Allergies  Medications:  [Prescriptions Prior to Admission]    [Prescriptions Prior to Admission]          Medications Prior  to Admission   Medication Sig Dispense Refill Last Dose/Taking    prenatal vitamin with DHA 27-0.8-228 MG Oral Cap Take 1 capsule by mouth in the morning.     5/2/2025 Morning    Cholecalciferol (VITAMIN D) 50 MCG (2000 UT) Oral Tab Take 50 mcg by mouth daily. 30 tablet 3 5/2/2025 Morning    Ferrous Sulfate 325 (65 Fe) MG Oral Tab Take 1 tablet (325 mg total) by mouth every other day. 90 tablet 2 More than a month        Review of Systems:   As documented in HPI     good fetal movement     Physical Exam:   Temp:  [98 °F (36.7 °C)-98.8 °F (37.1 °C)] 98.1 °F (36.7 °C)  Pulse:  [69-81] 80  Resp:  [16-18] 16  BP: (109-117)/(67-74) 117/67     Constitutional: alert, appears stated age, and cooperative  Respiratory: clear to auscultation bilaterally  Cardiac: regular rate and rhythm, S1, S2 normal, no murmur, click, rub or gallop  Abdomen: FHT present  Fetal Surveillance:  reactive nst.    Sterile vaginal exam:  def        Results:            Lab Results   Component Value Date     TREPONEMALAB Nonreactive 05/03/2025     ABO O 05/03/2025     RH Positive 05/03/2025     WBC 5.6 05/03/2025     HGB 12.8 05/03/2025     HCT 38.5 05/03/2025     .0 05/03/2025     CREATSERUM 0.67 09/11/2024     BUN 7 09/11/2024      09/11/2024     K 4.3 09/11/2024      09/11/2024     CO2 25 09/11/2024     GLU 87 09/11/2024     CA 9.6 09/11/2024     ALB 4.3 09/11/2024     ALKPHO 98 09/11/2024     BILT 0.5 09/11/2024     TP 7.9 09/11/2024     AST 14 09/11/2024     ALT 9 09/11/2024     TSH 1.500 11/09/2019               Lab Results   Component Value Date     COLORUR YELLOW 09/11/2024     CLARITY CLEAR 09/11/2024     SPECGRAVITY 1.011 09/11/2024     PROUR NEGATIVE 09/11/2024     GLUUR NEGATIVE 09/11/2024     KETUR TRACE (A) 09/11/2024     BILUR NEGATIVE 09/11/2024     NITRITE NEGATIVE 09/11/2024     LEUUR NEGATIVE 09/11/2024         No results found.        Assessment/Plan:    Catalina Hodges is at an estimated gestational age of  40w3d with an estimated date of delivery of:  Estimated Date of Delivery: 25     Not in labor.  Obstetrical history significant for  postdates. .     Admission problem(s):    Pregnancy (HCC)  Pt with IOL for postdates.  Pt counseled and all questions answered.               Risks, benefits, alternatives and possible complications have been discussed in detail with the patient.   Pre-admission, admission, and post admission procedures and expectations were discussed in detail.    All questions answered, all appropriate consents will be signed at the Hospital. Admission is planned for today.   Continue present management..     Rehan Buckley MD  2025  9:22 AM      Signed by Rehan Buckley MD on 2025  9:30 AM           DELIVERY NOTES     Date of Service: 2025  2:32 AM     Signed         [facility]  part of [facility]     Vaginal Delivery Note           Catalinajean carlos Hodges Patient Status:  Inpatient    1995 MRN [id number]   Location [facility] Attending Rehan Buckley MD   Hosp Day # 2 PCP SARY RAWLS MD      Delivery      Diagnosis: postdates/induction of labor     Labor Details: Induction     Procedure: operative vaginal delivery     Neonatologist Present: yes     Placenta  Date/Time of Delivery: 2025  2:12 AM   Delivery: spontaneous  Placenta to Pathology: yes  Cord Gases Submitted: yes     Cord Complications: none     Maternal Anesthesia: epidural   Episiotomy/Laceration Repair  Laceration: perineal 2nd degree     Sponge and Needle Counts:  Verified yes     Delivery Complications  Vacuum Assistance: indication for: maternal exhaustion and non-reassuring fetal status.  Verbal consent obtained. Station of fetal vertex is outlet (head at perineum). Bladder emptied: yes.  Type of vacuum used was a Kiwi 6000 (soft cup). Maximum Pressure in the green zone. Number of pulls 2. Number of pop offs none. Minutes applied to fetal  scalp 1. Pressure applied only with contration yes.  scalp injuries: none.  Additional comments: infant pink crying and active at delivery.     Hemorrhage?: No, patient is stable, asymptomatic and blood loss is within expected amount following delivery. Standard treatment provided to prevent PPH. Patient does not meet ACOG criteria for hemorrhage at this time.     QBL: Quantitative Blood Loss (mL)          Delivery Comments:  neonatology dr gonzalez at delivery.  Infant pink crying and active.  Apg 8, 9     Rehan Buckley MD   2025  2:32 AM                          Date of Service: 2025 12:46 PM     Signed         [facility]  part of [facility]     OB/GYNE Progress Note           Subjective      No c/o     Review of Systems:  Constitutional: feeling well     Objective   Vital signs in last 24 hours:  Temp:  [97.9 °F (36.6 °C)-98.9 °F (37.2 °C)] 98.1 °F (36.7 °C)  Pulse:  [74-89] 74  Resp:  [16] 16  BP: (107-118)/(67-77) 111/77     Input/Output:  No intake or output data in the 24 hours ending 25 1246     Weight (Last 6):      Wt Readings from Last 6 Encounters:   25 213 lb (96.6 kg)   25 213 lb (96.6 kg)   25 217 lb (98.4 kg)   04/15/25 215 lb 8 oz (97.8 kg)   04/10/25 210 lb (95.3 kg)   25 211 lb (95.7 kg)      Body mass index is 31.45 kg/m².      Exam:   Constitutional: comfortable  Uterus: fundus firm and fundus below umbilicus  Extremities: No edema  Negative Lanre's sign.        Results:            Lab Results   Component Value Date     TREPONEMALAB Nonreactive 2025     ABO O 2025     RH Positive 2025     WBC 9.1 2025     HGB 10.6 (L) 2025     HCT 32.9 (L) 2025     .0 2025     CREATSERUM 0.67 2024     BUN 7 2024      2024     K 4.3 2024      2024     CO2 25 2024     GLU 87 2024     CA 9.6 2024     ALB 4.3 2024     ALKPHO 98  09/11/2024     BILT 0.5 09/11/2024     TP 7.9 09/11/2024     AST 14 09/11/2024     ALT 9 09/11/2024     TSH 1.500 11/09/2019               Lab Results   Component Value Date     COLORUR YELLOW 09/11/2024     CLARITY CLEAR 09/11/2024     SPECGRAVITY 1.011 09/11/2024     PROUR NEGATIVE 09/11/2024     GLUUR NEGATIVE 09/11/2024     KETUR TRACE (A) 09/11/2024     BILUR NEGATIVE 09/11/2024     NITRITE NEGATIVE 09/11/2024     LEUUR NEGATIVE 09/11/2024         No results found.      Assessment/Plan     Pregnancy (HCC)  pp1           cpm        Discussed with: patient      Plan discussed with patient who verbalizes understanding and agreement.        Preston Vasquez MD  5/6/2025  12:46 PM                      MEDICATIONS ADMINISTERED IN LAST 1 DAY:  acetaminophen (Tylenol Extra Strength) tab 1,000 mg       Date Action Dose Route User    5/5/2025 2113 Given 1,000 mg Oral Batool Uribe RN          docusate sodium (Colace) cap 100 mg       Date Action Dose Route User    5/6/2025 0801 Given 100 mg Oral Aleja Perdue RN    5/5/2025 1726 Given 100 mg Oral Aleja Perdue RN          ibuprofen (Motrin) tab 600 mg       Date Action Dose Route User    5/6/2025 1322 Given 600 mg Oral Aleja Perdue RN    5/6/2025 0801 Given 600 mg Oral Aleja Perdue RN            Vitals (last day)       Date/Time Temp Pulse Resp BP SpO2 Weight O2 Device O2 Flow Rate (L/min) [facility]    05/06/25 0800 98.1 °F (36.7 °C) 74 16 111/77 -- -- None (Room air) --     05/05/25 2100 98.9 °F (37.2 °C) 89 16 118/72 -- -- None (Room air) --     05/05/25 1315 97.9 °F (36.6 °C) 78 16 107/67 -- -- None (Room air) --     05/05/25 0926 98.2 °F (36.8 °C) 66 15 115/69 -- -- None (Room air) --     05/05/25 0620 97.7 °F (36.5 °C) 63 14 117/66 -- -- None (Room air) --     05/05/25 0440 98.1 °F (36.7 °C) 66 16 114/64 -- -- None (Room air) --     05/05/25 0400 -- 67 -- 120/64 -- -- -- --     05/05/25 0345 -- 73 -- 108/50 -- -- -- --     05/05/25 0330 --  76 -- 112/56 -- -- -- -- DL    05/05/25 0315 -- 77 -- 115/52 -- -- -- -- DL    05/05/25 0300 -- 79 -- 108/58 -- -- -- -- DL    05/05/25 0245 -- 86 -- 114/37 -- -- -- -- DL    05/05/25 0233 -- -- -- 115/100 -- -- -- -- DL    05/05/25 0230 -- 91 -- 106/26 99 % -- -- -- DL    05/05/25 0215 -- 80 -- 117/60 100 % -- -- -- DL    05/05/25 0200 -- 87 -- 106/64 100 % -- -- -- DL    05/05/25 0015 -- 81 -- 110/65 100 % -- -- -- DL    05/05/25 0005 -- 94 -- -- 100 % -- -- -- DL    05/05/25 0000 98.8 °F (37.1 °C) 87 -- 111/63 89 % -- -- -- DL

## 2025-05-06 NOTE — LACTATION NOTE
05/06/25 1100   Evaluation Type   Evaluation Type Inpatient   Problems identified   Problems identified Unable to acheive sustained latch;Knowledge deficit   Problems Identified Other Dr. Lai said there was a tongue tie on infant   Maternal history   Maternal history Induction of labor   Breastfeeding goal   Breastfeeding goal To maintain breast milk feeding per patient goal   Maternal Assessment   Prior breastfeeding experience (comment below) Primip   Breastfeeding Assistance LC assistance declined at this time   Guidelines for use of:   Breast pump type Ameda Platinum;Hand Pump;Other   Current use of pump: Occasional   Suggested use of pump Pump each time a supplement is offered;Pump 8-12X/24hr;Pump if infant is not latching to breast   Other (comment) Mom states she has no milk.  Reviewed milk production, pumping/supplementation guidelines. Pump settings reviewed as well. Support provided.  Informed of warmline and OP clinic. Encouraged to reach out with further questions.

## 2025-05-07 VITALS
HEART RATE: 73 BPM | HEIGHT: 69 IN | DIASTOLIC BLOOD PRESSURE: 80 MMHG | BODY MASS INDEX: 31.55 KG/M2 | TEMPERATURE: 98 F | SYSTOLIC BLOOD PRESSURE: 120 MMHG | OXYGEN SATURATION: 99 % | WEIGHT: 213 LBS | RESPIRATION RATE: 16 BRPM

## 2025-05-07 RX ORDER — ACETAMINOPHEN 500 MG
500 TABLET ORAL EVERY 6 HOURS PRN
Qty: 40 TABLET | Refills: 0 | Status: SHIPPED | OUTPATIENT
Start: 2025-05-07

## 2025-05-07 RX ORDER — IBUPROFEN 600 MG/1
600 TABLET, FILM COATED ORAL EVERY 6 HOURS
Qty: 40 TABLET | Refills: 1 | Status: SHIPPED | OUTPATIENT
Start: 2025-05-07

## 2025-05-07 NOTE — DISCHARGE SUMMARY
[facility]  part of [facility]    OB/GYNE Postpartum Discharge Summary      Catalina Hodges Patient Status:  Inpatient    1995 MRN [id number]   Location [facility] 3SE Attending Rehan Buckley MD   Hosp Day # 4 PCP SARY RAWLS MD              Date:     2025    Patient:  Catalina Hodges       :     1995  Age:      29 year old      Gender:  female  MRN:   [id number]  Admit Date:  5/3/2025   Discharge Date:  2025     Subjective :  The patient 29 year old y/o  is Postpartum day #2 from a operative vaginal delivery  delivery.  She is doing well.  Lochia normal.  Pain controlled.  Breast feeding with some supply issues. Voiding and passing flatus; has had had a bowel movement. Pregnancy/delivery complicated by operative delivery.            Objective   Physical Exam:  /80 (BP Location: Right arm)   Pulse 73   Temp 98 °F (36.7 °C) (Oral)   Resp 16   Ht 5' 9\" (1.753 m)   Wt 213 lb (96.6 kg)   LMP 2024 (Exact Date)   SpO2 99%   Breastfeeding No   BMI 31.45 kg/m²   No intake or output data in the 24 hours ending 25 1102  Abdomen - soft, ND, NT  Fundus -firm, NT  Lower Extremities - NT    Result Data:  Lab Results   Component Value Date    WBC 9.1 2025    RBC 3.95 2025    HGB 10.6 (L) 2025    HCT 32.9 (L) 2025    .0 2025    GLU 87 2024    BUN 7 2024     2024    K 4.3 2024     2024    CA 9.6 2024    CO2 25 2024     Abnormal Labs Reviewed   CBC WITH DIFFERENTIAL WITH PLATELET - Abnormal; Notable for the following components:       Result Value    MCV 78.7 (*)     All other components within normal limits   CBC WITH DIFFERENTIAL WITH PLATELET - Abnormal; Notable for the following components:    HGB 10.6 (*)     HCT 32.9 (*)     All other components within normal limits                   Discharge Medications        ASK your doctor about these  medications        Instructions Prescription details   Ferrous Sulfate 325 (65 Fe) MG Tabs      Take 1 tablet (325 mg total) by mouth every other day.   Quantity: 90 tablet  Refills: 2     prenatal vitamin with DHA 27-0.8-228 MG Caps      Take 1 capsule by mouth in the morning.   Refills: 0     Vitamin D 50 MCG (2000 UT) Tabs      Take 50 mcg by mouth daily.   Quantity: 30 tablet  Refills: 3                Assessment and Plan:    Active Problems:    Pregnancy (HCC)        PPD # 2  Contraception: likely pills postpartum  Cont PP care.  Ambulate.  Discharge home - f/u 2-3 wks for PP visit  Discharge Condition - Stable   Call office if heavy bleeding soaking a pad in <1hr, fever, problems breastfeeding or any depression issues.      Linda Lainez MD

## 2025-05-07 NOTE — PROGRESS NOTES
FOCUS: MOM/BABY DISCHARGE    D: DISCHARGE ORDERS RECEIVED FROM PROVIDERS.    A: POSTPARTUM AND  DISCHARGE AVS'S GIVEN AND REVIEWED EXTENSIVELY, PRESCRIPTIONS/MEDICATIONS REVIEWED, AND DISCHARGE PAPERWORK SIGNED. VOCALIZED UNDERSTANDING. ID BANDS MATCHED. HUGS TAG REMOVED. PATIENT INFORMED WHEN TO MAKE FOLLOW UP APPOINTMENTS WITH PROVIDER AND PEDIATRICIAN.    R: PARENT(S) INTERACTING APPROPRIATELY WITH INFANT. VERBALIZED UNDERSTANDING OF FOLLOW UP INSTRUCTIONS. DISCHARGED IN STABLE CONDITION. INFANT DISCHARGED HOME IN CAR SEAT.

## 2025-05-07 NOTE — DISCHARGE INSTRUCTIONS
-Pelvic rest for 6 weeks; no sex, tampons, douching, baths, or pools until after 6 weeks check-up.  -No heavy lifting; increase activity gradually.  -No driving if taking narcotics.    CALL YOUR PROVIDER IF:  Increased/heavy bleeding (I.e. Changing a saturated pad every hour).  New onset chills/fever greater than 100.4.  Pain in your vagina or abdomen that gets worse and isn't relieved with medicine.  Swelling or discharge with a bad odor from vagina.  Burning, pain, red streaks, or lumpy areas in your breasts that may be accompanied by flu-like symptoms.  Painful urination, or inability to control urination.  Nausea, vomiting, dizziness, or fainting.  Feelings of extreme sadness or anxiety, or a feeling that you don’t want to be with your baby.  Redness, warmth, or pain in the lower leg.  Chest pain or shortness of breath.  If : Check incision site AT LEAST 2x daily for signs and symptoms of infection (increased redness, warmth, tenderness, or drainage)    Mom & Baby Hour: Meets in person every WEDNESDAY at 10am at the [facility] in [place] ([address]) in the community education room. The group is for new moms and their babies up to 6 months of age. It includes breastfeeding supports with a certified lactation educator to be available to answer your breastfeeding questions.

## 2025-05-08 NOTE — PAYOR COMM NOTE
--------------  DISCHARGE REVIEW    Payor: [employer] CHOICE/HMO/POS/EPO  Subscriber #:  [id number]  Authorization Number: [id number]    Admit date: 5/3/25  Admit time:   8:10 AM  Discharge Date: 2025  1:20 PM     Admitting Physician: Rehan Buckley MD  Attending Physician:  No att. providers found  Primary Care Physician: Sary Rawls MD          Discharge Summary Notes        Discharge Summary signed by Linda Lainez MD at 2025 11:55 AM       Author: Linda Lainez MD Specialty: OBSTETRICS & GYNECOLOGY Author Type: Physician    Filed: 2025 11:55 AM Date of Service: 2025 11:01 AM Status: Signed    : Linda Lainez MD (Physician)         [facility]  part of [facility]    OB/GYNE Postpartum Discharge Summary      Catalina Hodges Patient Status:  Inpatient    1995 MRN [id number]   Location [facility] 3SE Attending Rehan Buckley MD   Hosp Day # 4 PCP SARY RAWLS MD              Date:     2025    Patient:  Catalina Hodges       :     1995  Age:      29 year old      Gender:  female  MRN:   [id number]  Admit Date:  5/3/2025   Discharge Date:  2025     Subjective :  The patient 29 year old y/o  is Postpartum day #2 from a operative vaginal delivery  delivery.  She is doing well.  Lochia normal.  Pain controlled.  Breast feeding with some supply issues. Voiding and passing flatus; has had had a bowel movement. Pregnancy/delivery complicated by operative delivery.           Objective   Physical Exam:  /80 (BP Location: Right arm)   Pulse 73   Temp 98 °F (36.7 °C) (Oral)   Resp 16   Ht 5' 9\" (1.753 m)   Wt 213 lb (96.6 kg)   LMP 2024 (Exact Date)   SpO2 99%   Breastfeeding No   BMI 31.45 kg/m²   No intake or output data in the 24 hours ending 25 1102  Abdomen - soft, ND, NT  Fundus -firm, NT  Lower Extremities - NT    Result Data:  Lab Results   Component Value Date    WBC 9.1  05/06/2025    RBC 3.95 05/06/2025    HGB 10.6 (L) 05/06/2025    HCT 32.9 (L) 05/06/2025    .0 05/06/2025    GLU 87 09/11/2024    BUN 7 09/11/2024     09/11/2024    K 4.3 09/11/2024     09/11/2024    CA 9.6 09/11/2024    CO2 25 09/11/2024     Abnormal Labs Reviewed   CBC WITH DIFFERENTIAL WITH PLATELET - Abnormal; Notable for the following components:       Result Value    MCV 78.7 (*)     All other components within normal limits   CBC WITH DIFFERENTIAL WITH PLATELET - Abnormal; Notable for the following components:    HGB 10.6 (*)     HCT 32.9 (*)     All other components within normal limits                  Discharge Medications        ASK your doctor about these medications        Instructions Prescription details   Ferrous Sulfate 325 (65 Fe) MG Tabs      Take 1 tablet (325 mg total) by mouth every other day.   Quantity: 90 tablet  Refills: 2     prenatal vitamin with DHA 27-0.8-228 MG Caps      Take 1 capsule by mouth in the morning.   Refills: 0     Vitamin D 50 MCG (2000 UT) Tabs      Take 50 mcg by mouth daily.   Quantity: 30 tablet  Refills: 3                Assessment and Plan:    Active Problems:    Pregnancy (HCC)        PPD # 2  Contraception: likely pills postpartum  Cont PP care.  Ambulate.  Discharge home - f/u 2-3 wks for PP visit  Discharge Condition - Stable   Call office if heavy bleeding soaking a pad in <1hr, fever, problems breastfeeding or any depression issues.      Linda Lainez MD      Electronically signed by Linda Lainez MD on 5/7/2025 11:55 AM         REVIEWER COMMENTS

## 2025-05-27 ENCOUNTER — TELEPHONE (OUTPATIENT)
Dept: OBGYN UNIT | Facility: HOSPITAL | Age: 30
End: 2025-05-27

## 2025-06-05 ENCOUNTER — POSTPARTUM (OUTPATIENT)
Dept: OBGYN CLINIC | Facility: CLINIC | Age: 30
End: 2025-06-05
Payer: COMMERCIAL

## 2025-06-05 VITALS
DIASTOLIC BLOOD PRESSURE: 75 MMHG | SYSTOLIC BLOOD PRESSURE: 113 MMHG | BODY MASS INDEX: 28 KG/M2 | HEART RATE: 72 BPM | WEIGHT: 190 LBS

## 2025-06-05 DIAGNOSIS — Z30.011 OCP (ORAL CONTRACEPTIVE PILLS) INITIATION: Primary | ICD-10-CM

## 2025-06-05 DIAGNOSIS — Z30.011 ENCOUNTER FOR INITIAL PRESCRIPTION OF CONTRACEPTIVE PILLS: ICD-10-CM

## 2025-06-05 RX ORDER — NORGESTIMATE AND ETHINYL ESTRADIOL 7DAYSX3 LO
1 KIT ORAL DAILY
Qty: 28 TABLET | Refills: 11 | Status: SHIPPED | OUTPATIENT
Start: 2025-06-05

## 2025-06-05 NOTE — PROGRESS NOTES
HPI:     Catalina Hodges is a 30 year old female who presents for a pp visit, pt not breastfeeding, pt wants ocps,  will send rx, to start at 6 weeks pp zechariah. Pt counseled on use, start, side effects and risks of OCPs.  Side effects included break through bleeding, headache, weight changes, mood changes etc.  Risks included blood clots/stroke.  Pt understands and will start OCPs.    Wt Readings from Last 6 Encounters:   06/05/25 190 lb (86.2 kg)   05/03/25 213 lb (96.6 kg)   04/28/25 213 lb (96.6 kg)   04/21/25 217 lb (98.4 kg)   04/15/25 215 lb 8 oz (97.8 kg)   04/10/25 210 lb (95.3 kg)     Body mass index is 28.06 kg/m².    Cholesterol, Total (mg/dL)   Date Value   11/09/2019 109     CHOLESTEROL, TOTAL (mg/dL)   Date Value   09/11/2024 136   09/28/2022 125     HDL Cholesterol (mg/dL)   Date Value   11/09/2019 52     HDL CHOLESTEROL (mg/dL)   Date Value   09/11/2024 50   09/28/2022 54     LDL Cholesterol (mg/dL)   Date Value   11/09/2019 50     LDL-CHOLESTEROL (mg/dL (calc))   Date Value   09/11/2024 74   09/28/2022 58     AST (U/L)   Date Value   09/11/2024 14   09/28/2022 12   11/09/2019 13 (L)   08/03/2019 9 (L)     ALT (U/L)   Date Value   09/11/2024 9   09/28/2022 9   11/09/2019 16   08/03/2019 14        Current Medications[1]   Past Medical History[2]   Past Surgical History[3]   Family History[4]   Social History:   Short Social Hx on File[5]         REVIEW OF SYSTEMS:   GENERAL: feels well otherwise  SKIN: denies any unusual skin lesions  EYES:denies blurred vision or double vision  HEENT: denies nasal congestion, sinus pain or ST  LUNGS: denies shortness of breath with exertion  CARDIOVASCULAR: denies chest pain on exertion  GI: denies abdominal pain,denies heartburn  : denies dysuria, vaginal discharge or itching,periods regular   MUSCULOSKELETAL: denies back pain  NEURO: denies headaches  PSYCHE: denies depression or anxiety  HEMATOLOGIC: denies hx of anemia  ENDOCRINE: denies thyroid  history  ALL/ASTHMA: denies hx of allergy or asthma    EXAM:   /75   Pulse 72   Wt 190 lb (86.2 kg)   LMP 07/25/2024 (Exact Date)   Breastfeeding No   BMI 28.06 kg/m²   Body mass index is 28.06 kg/m².   GENERAL: well developed, well nourished,in no apparent distress  SKIN: no rashes,no suspicious lesions  HEENT: atraumatic, normocephalic  EYES:normal in appearance  NECK: supple,no adenopathy  CHEST: no chest tenderness  BREAST def pt  LUNGS: clear to auscultation  CARDIO: RRR without murmur  GI: good BS's,no masses, HSM or tenderness  :introitus is normal,scant discharge,cervix is pink,no adnexal masses or tenderness  MUSCULOSKELETAL: back is not tender,FROM of the back  EXTREMITIES: no cyanosis, clubbing or edema  NEURO: Oriented times three      ASSESSMENT AND PLAN:   Catalina Hodges is a 30 year old female who presents for a pp visit, pt not breastfeeding, pt wants ocps,  will send rx, to start at 6 weeks pp zechariah. Pt counseled on use, start, side effects and risks of OCPs.  Side effects included break through bleeding, headache, weight changes, mood changes etc.  Risks included blood clots/stroke.  Pt understands and will start OCPs. Self breast exam explained. Health maintenance. Body mass index is 28.06 kg/m²., recommended low fat diet and aerobic exercise 30 minutes three times weekly.  The patient indicates understanding of these issues and agrees to the plan.  The patient is asked to return for an annual visit.           [1]   Current Outpatient Medications   Medication Sig Dispense Refill    ibuprofen 600 MG Oral Tab Take 1 tablet (600 mg total) by mouth every 6 (six) hours. 40 tablet 1    witch hazel-glycerin External Pads Apply 1 each topically as needed. 40 each 0    prenatal vitamin with DHA 27-0.8-228 MG Oral Cap Take 1 capsule by mouth in the morning.      Cholecalciferol (VITAMIN D) 50 MCG (2000 UT) Oral Tab Take 50 mcg by mouth daily. 30 tablet 3    Ferrous Sulfate 325 (65 Fe) MG Oral  Tab Take 1 tablet (325 mg total) by mouth every other day. 90 tablet 2    acetaminophen 500 MG Oral Tab Take 1 tablet (500 mg total) by mouth every 6 (six) hours as needed. (Patient not taking: Reported on 6/5/2025) 40 tablet 0   [2] No past medical history on file.  [3] No past surgical history on file.  [4]   Family History  Problem Relation Age of Onset    Cancer Mother 40        breast cancer    No Known Problems Maternal Grandmother     No Known Problems Maternal Grandfather     Diabetes Paternal Grandmother     High Blood Pressure Paternal Grandfather    [5]   Social History  Socioeconomic History    Marital status: Single   Tobacco Use    Smoking status: Never    Smokeless tobacco: Never   Vaping Use    Vaping status: Never Used   Substance and Sexual Activity    Alcohol use: Not Currently    Drug use: Never     Social Drivers of Health     Food Insecurity: No Food Insecurity (12/18/2024)    Food Insecurity     Food Insecurity: Never true   Transportation Needs: No Transportation Needs (12/18/2024)    Transportation Needs     Lack of Transportation: No   Stress: No Stress Concern Present (12/18/2024)    Stress     Feeling of Stress : No   Housing Stability: Low Risk  (12/18/2024)    Housing Stability     Housing Instability: No

## (undated) NOTE — LETTER
[facility]  Administration of Anesthesia  I, Catalina Hodges agree to be cared for by a physician anesthesiologist alone and/or with a nurse anesthetist, who is specially trained to monitor me and give me medicine to put me to sleep or keep me comfortable during my procedure    I understand that my anesthesiologist and/or anesthetist is not an employee or agent of [facility] or [employer]. He or she works for [facility]    As the patient asking for anesthesia services, I agree to:  Allow the anesthesiologist (anesthesia doctor) to give me medicine and do additional procedures as necessary. Some examples are: Starting or using an “IV” to give me medicine, fluids or blood during my procedure, and having a breathing tube placed to help me breathe when I’m asleep (intubation). In the event that my heart stops working properly, I understand that my anesthesiologist will make every effort to sustain my life, unless otherwise directed by [facility] Do Not Resuscitate documents.  Tell my anesthesia doctor before my procedure:  If I am pregnant.  The last time that I ate or drank.  iii. All of the medicines I take (including prescriptions, herbal supplements, and pills I can buy without a prescription (including street drugs/illegal medications). Failure to inform my anesthesiologist about these medicines may increase my risk of anesthetic complications.  iv.If I am allergic to anything or have had a reaction to anesthesia before.  I understand how the anesthesia medicine will help me (benefits).  I understand that with any type of anesthesia medicine there are risks:  The most common risks are: nausea, vomiting, sore throat, muscle soreness, damage to my eyes, mouth, or teeth (from breathing tube placement).  Rare risks include: remembering what happened during my procedure, allergic reactions to medications, injury to my airway, heart, lungs, vision, nerves, or  muscles and in extremely rare instances death.  My doctor has explained to me other choices available to me for my care (alternatives).  Pregnant Patients (“epidural”):  I understand that the risks of having an epidural (medicine given into my back to help control pain during labor), include itching, low blood pressure, difficulty urinating, headache or slowing of the baby’s heart. Very rare risks include infection, bleeding, seizure, irregular heart rhythms and nerve injury.  Regional Anesthesia (“spinal”, “epidural”, & “nerve blocks”):  I understand that rare but potential complications include headache, bleeding, infection, seizure, irregular heart rhythms, and nerve injury.    _____________________________________________________________________________  Patient (or Representative) Signature/Relationship to Patient  Date   Time    _____________________________________________________________________________   Name (if used)    Language/Organization   Time    _____________________________________________________________________________  Nurse Anesthetist Signature     Date   Time  _____________________________________________________________________________  Anesthesiologist Signature     Date   Time  I have discussed the procedure and information above with the patient (or patient’s representative) and answered their questions. The patient or their representative has agreed to have anesthesia services.    _____________________________________________________________________________  Witness        Date   Time  I have verified that the signature is that of the patient or patient’s representative, and that it was signed before the procedure  Patient Name: Catalina Hodges     : 1995                 Printed: 5/3/2025 at 8:10 AM    Medical Record #: [id number]                                            Page 1 of 1  ----------ANESTHESIA CONSENT----------

## (undated) NOTE — LETTER
9/11/2024          To Whom It May Concern:    Catalina Hodges is currently under my medical care and may return to work at this time.    She may return to work .  Activity is restricted as follows: no lifting over 15 lbs..    If you require additional information please contact our office.        Sincerely,       SARY RAWLS MD          Document generated by:  SARY RAWLS MD

## (undated) NOTE — LETTER
November 8, 2019     [address]          Dear Valerio Carter:    Below are the results from your recent visit:  the following results are within normal limits: [facility].      Resulted Orders   URINALYSIS, AUTO, W/

## (undated) NOTE — LETTER
VACCINE ADMINISTRATION RECORD  PARENT / GUARDIAN APPROVAL  Date: 2025  Vaccine administered to: Catalina Hodges     : 1995    MRN: [id number]    A copy of the appropriate Centers for Disease Control and Prevention Vaccine Information statement has been provided. I have read or have had explained the information about the diseases and the vaccines listed below. There was an opportunity to ask questions and any questions were answered satisfactorily. I believe that I understand the benefits and risks of the vaccine cited and ask that the vaccine(s) listed below be given to me or to the person named above (for whom I am authorized to make this request).    VACCINES ADMINISTERED:  Tdap    I have read and hereby agree to be bound by the terms of this agreement as stated above. My signature is valid until revoked by me in writing.  This document is signed by patient , relationship: Self on 2025.:                                                                                                                                         Parent / Guardian Signature                                                Date    BRADEN Estrada served as a witness to authentication that the identity of the person signing electronically is in fact the person represented as signing.    This document was generated by BRADEN Estrada on 2025.

## (undated) NOTE — LETTER
11/28/2022              [address] [place] [address] [address] [facility] [address]         Dear Emma Garcia,    This letter is to inform you that our office has made several attempts to reach you by phone without success. We were attempting to contact you by phone regarding lab results. Socrates MAGANA working with Dr. Zonia Mccord had placed an order for you to your pharmcacy to take azithromycin 1 gram at time of  from the pharmacy and repeat in one hour. You will need to contact any sexual partners regarding a positive test for Chlamydia and you will need to be rechecked in 3 months. You have order's for a retest in the system and should get this done approximately February 29th, 2023. Please contact our office at the number listed below as soon as you receive this letter to discuss this issue and to make the necessary changes in our system to your contact information. Thank you for your cooperation.         Sincerely,    Patrick Martines MD  [address] [place] [address]   [phone]        Document electronically generated by:  Aditya Valladares RN